# Patient Record
Sex: MALE | Race: WHITE | Employment: OTHER | URBAN - METROPOLITAN AREA
[De-identification: names, ages, dates, MRNs, and addresses within clinical notes are randomized per-mention and may not be internally consistent; named-entity substitution may affect disease eponyms.]

---

## 2017-06-07 PROBLEM — Z87.891 FORMER SMOKER: Status: ACTIVE | Noted: 2017-04-06

## 2017-06-07 PROBLEM — R79.89 ABNORMAL THYROID STIMULATING HORMONE (TSH) LEVEL: Status: ACTIVE | Noted: 2017-05-11

## 2019-11-11 PROBLEM — E03.9 ACQUIRED HYPOTHYROIDISM: Status: ACTIVE | Noted: 2017-05-11

## 2020-01-17 PROBLEM — F10.10 ETOH ABUSE: Status: ACTIVE | Noted: 2020-01-17

## 2020-01-20 ENCOUNTER — HOSPITAL ENCOUNTER (OUTPATIENT)
Dept: CT IMAGING | Age: 40
Discharge: HOME | End: 2020-01-20
Attending: INTERNAL MEDICINE
Payer: SELF-PAY

## 2020-01-20 DIAGNOSIS — I10 ESSENTIAL (PRIMARY) HYPERTENSION: ICD-10-CM

## 2020-01-20 DIAGNOSIS — E78.2 MIXED HYPERLIPIDEMIA: ICD-10-CM

## 2020-01-20 PROCEDURE — 75571 CT HRT W/O DYE W/CA TEST: CPT

## 2021-05-18 PROBLEM — Z78.9 ALCOHOL USE: Status: ACTIVE | Noted: 2020-01-17

## 2021-05-18 PROBLEM — R73.01 IFG (IMPAIRED FASTING GLUCOSE): Status: ACTIVE | Noted: 2021-05-18

## 2022-03-19 PROBLEM — R73.01 IFG (IMPAIRED FASTING GLUCOSE): Status: ACTIVE | Noted: 2021-05-18

## 2022-03-19 PROBLEM — E03.9 ACQUIRED HYPOTHYROIDISM: Status: ACTIVE | Noted: 2017-05-11

## 2022-03-19 PROBLEM — Z87.891 FORMER SMOKER: Status: ACTIVE | Noted: 2017-04-06

## 2022-03-19 PROBLEM — F10.90 ALCOHOL USE: Status: ACTIVE | Noted: 2020-01-17

## 2022-03-19 PROBLEM — Z78.9 ALCOHOL USE: Status: ACTIVE | Noted: 2020-01-17

## 2022-05-24 DIAGNOSIS — F41.1 ANXIETY, GENERALIZED: Primary | ICD-10-CM

## 2022-05-25 RX ORDER — DIAZEPAM 5 MG/1
5 TABLET ORAL 2 TIMES DAILY
Qty: 60 TABLET | Refills: 2 | Status: SHIPPED | OUTPATIENT
Start: 2022-05-25 | End: 2022-08-24 | Stop reason: SDUPTHER

## 2022-07-11 ENCOUNTER — TELEPHONE (OUTPATIENT)
Dept: INTERNAL MEDICINE CLINIC | Facility: CLINIC | Age: 42
End: 2022-07-11

## 2022-07-11 DIAGNOSIS — F33.1 MODERATE EPISODE OF RECURRENT MAJOR DEPRESSIVE DISORDER (HCC): ICD-10-CM

## 2022-07-11 DIAGNOSIS — F41.1 ANXIETY, GENERALIZED: Primary | ICD-10-CM

## 2022-08-11 ENCOUNTER — TELEPHONE (OUTPATIENT)
Dept: INTERNAL MEDICINE CLINIC | Facility: CLINIC | Age: 42
End: 2022-08-11

## 2022-08-23 DIAGNOSIS — F41.1 ANXIETY, GENERALIZED: ICD-10-CM

## 2022-08-24 RX ORDER — DIAZEPAM 5 MG/1
5 TABLET ORAL 2 TIMES DAILY
Qty: 60 TABLET | Refills: 2 | Status: SHIPPED | OUTPATIENT
Start: 2022-08-24 | End: 2022-09-23

## 2022-10-04 ENCOUNTER — OFFICE VISIT (OUTPATIENT)
Dept: BEHAVIORAL/MENTAL HEALTH CLINIC | Age: 42
End: 2022-10-04
Payer: MEDICAID

## 2022-10-04 VITALS
OXYGEN SATURATION: 98 % | HEART RATE: 87 BPM | HEIGHT: 70 IN | BODY MASS INDEX: 30.92 KG/M2 | SYSTOLIC BLOOD PRESSURE: 142 MMHG | DIASTOLIC BLOOD PRESSURE: 88 MMHG | WEIGHT: 216 LBS | TEMPERATURE: 98.1 F

## 2022-10-04 DIAGNOSIS — F41.0 PANIC DISORDER: Primary | ICD-10-CM

## 2022-10-04 PROCEDURE — 99417 PROLNG OP E/M EACH 15 MIN: CPT | Performed by: STUDENT IN AN ORGANIZED HEALTH CARE EDUCATION/TRAINING PROGRAM

## 2022-10-04 PROCEDURE — 99205 OFFICE O/P NEW HI 60 MIN: CPT | Performed by: STUDENT IN AN ORGANIZED HEALTH CARE EDUCATION/TRAINING PROGRAM

## 2022-10-04 RX ORDER — SERTRALINE HYDROCHLORIDE 100 MG/1
TABLET, FILM COATED ORAL
Qty: 30 TABLET | Refills: 2 | Status: SHIPPED | OUTPATIENT
Start: 2022-10-04

## 2022-10-04 RX ORDER — DIAZEPAM 5 MG/1
TABLET ORAL
COMMUNITY
Start: 2022-09-23

## 2022-10-04 ASSESSMENT — ANXIETY QUESTIONNAIRES
GAD7 TOTAL SCORE: 13
7. FEELING AFRAID AS IF SOMETHING AWFUL MIGHT HAPPEN: 2
6. BECOMING EASILY ANNOYED OR IRRITABLE: 1
1. FEELING NERVOUS, ANXIOUS, OR ON EDGE: 3
IF YOU CHECKED OFF ANY PROBLEMS ON THIS QUESTIONNAIRE, HOW DIFFICULT HAVE THESE PROBLEMS MADE IT FOR YOU TO DO YOUR WORK, TAKE CARE OF THINGS AT HOME, OR GET ALONG WITH OTHER PEOPLE: SOMEWHAT DIFFICULT
2. NOT BEING ABLE TO STOP OR CONTROL WORRYING: 3
4. TROUBLE RELAXING: 1
5. BEING SO RESTLESS THAT IT IS HARD TO SIT STILL: 0
3. WORRYING TOO MUCH ABOUT DIFFERENT THINGS: 3

## 2022-10-04 ASSESSMENT — PATIENT HEALTH QUESTIONNAIRE - PHQ9
SUM OF ALL RESPONSES TO PHQ QUESTIONS 1-9: 0
2. FEELING DOWN, DEPRESSED OR HOPELESS: 0
1. LITTLE INTEREST OR PLEASURE IN DOING THINGS: 0
SUM OF ALL RESPONSES TO PHQ QUESTIONS 1-9: 0
SUM OF ALL RESPONSES TO PHQ9 QUESTIONS 1 & 2: 0

## 2022-10-04 NOTE — PROGRESS NOTES
T[place]       Patient Name: Karyna Reed    Patient : 1980    Patient MRN: [id number]    Insurance: [facility] [employer]    Primary Language: Zoey Zepeda      Date of Service: 10/4/2022    Type of Service: Medication management    Other Services Involved: N/A       Phone Number: [phone]   Emergency Contact: Pamela Martinez, partner, [phone]       Chief Complaint: \"I'm just really nervous right now\"      History of Present Illness    Karyna Reed is a 43 y.o. male with reported prior psychiatric history significant for anxiety who presents for initial evaluation. Pt reports that they are currently prescribed: Celexa 40 mg (states that he has been attempting to wean off and is only taking 20 mg), Valium 5 mg BID PRN (typically takes twice daily). 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 While he is often able to work through his panic, he has more frequently had to leave early during performances. States that he was diagnosed with GLO, panic disorder when he was 15 yo, expressing that he would frequently experience stomach issues that required further work-up prior to this diagnosis. Reports being prescribed several medications over the year, some helpful some not, and had periods of up to several years in which he didn't experience significant symptoms. States that his symptoms have gradually worsened and are now occurring a couple times/wk, but often feels higher baseline anxiety daily. Reports that his PCP had increased his Celexa to 40 mg, which was helpful, but caused SE (sexual dysfunction). Psychiatric Review of Systems    Depression: Denies depressed mood and anhedonia. Expresses that he cannot recall a time in which he experienced symptoms of clinical depression for an extended period of time.  Denies current or active SI/HI. Anxiety: Denies generalized worries or difficulty controlling these . Reports that the only thing that bothers him frequently is a \"health anxiety,\" stating that he is often concerned about his health, but does not necessarily seek frequent medical treatment. States that his heart is of particular concern due to a hx of recurrent PVCs, but has had prior cardiac w/u w/o significant concern. Of note, he shares that he had multiple family members die around the time he first began experiencing the above anxiety, and thinks it may contribute to his anxiety. Panic: reports hx of panic since childhood; states that he will initially experience a sensation of dizziness, fear of something bad happening, hyperventilation, shaking, etc. Generally lasts 10-15 min, but will sometimes fixate on his symptoms for up to an hour. Reports avoidance behaviors due to concern of having a panic attack and being unable to escape. Denies agoraphobia.     Hypomania/yanna: denies prior hx of distinct periods of elevated mood, grandiosity, decreased need for sleep, etc.    Psychosis: denies hallucinations, delusions, disorganized speech, and disorganized or catatonic behaviors    Trauma: denies re-experiencing, avoidance behaviors, hypervigilance or reactivity, negative self-concept, affect dysregulation, and interpersonal difficulties related to prior traumatic event         Psychiatric History    Inpatient psychiatric hospitalizations: denies    Previous outpatient psychiatric treatment: first received medication for panic when he was 15-13 yo through his PCP; off and on over the years    Prior therapy: saw a therapist during adolescence, off and on; last worked with counselor 3-4 yrs ago    Prior psychiatric medication trials: Anafrinil, Paxil, Zoloft, Buspar, Celexa, Luvox    Current psychiatric medication: Celexa 20 mg daily, Valium 5 mg BID PRN (typically takes twice/day)    History of suicide attempts: denies    Self injurious behaviors: briefly cut in his early 25s    History of trauma, violence or abuse: denies prior physical abuse; reports hx of yelling between his parents, but can't recall significant verbal abuse directed towards him; denies remembering clear details of prior sexual abuse, but states that \"my wife is convinced something happened to me, saying that I have all the symptoms\"      Family History    Mental illness in family: father - thinks that he may struggle with anxiety, but isn't diagnosed    Substance abuse in family: no known    Completed suicide in family: no known         Social History    From [place], raised by mother and grandparents, but would still see his father; reports that his parents  when he was 3 yo, stating that they \"would argue, yell all of the time;\" states father had anger issues, would kick holes in the wall, but has never seen father be violent; describes childhood as \"fairly well\"    : denies    Children: three children (6 yo, 10 yo, 3 yo)    Living Situation: with partner, three children    Level of Education:     Occupation: musician; instrument repair     History:  denies    Legal History: denies    Guns in home: reports having guns; locked in safe; loaded         Substance Abuse History    Tobacco: previously smoked cigarettes up to 1/2 ppd; quit nearly 10 yrs ago    Alcohol: reports drinking daily, typically 4-6 drinks/day (beer); reports that these has been consistent for the past several months, but at least 2 beers/day over the past several years    Cannabis: denies    Stimulants: denies    Opioids: denies    Benzodiazepines: reportedly only as prescribed    Hallucinogens: denies    Other: denies    The patient denies the use of any other substance of abuse.     History of drug rehabilitation or detoxification: denies        Past Medical History:    Past Medical History:   Diagnosis Date    Anxiety     Depression     Hypertension     Hypertriglyceridemia Allergies:    Patient has no known allergies. Current Home Medications:    Current Outpatient Medications   Medication Instructions    atenolol (TENORMIN) 100 MG tablet TAKE 1 TAB BY MOUTH DAILY FOR 30 DAYS. citalopram (CELEXA) 40 MG tablet 1 tab po qd    diazePAM (VALIUM) 5 MG tablet TAKE 1 TABLET BY MOUTH TWICE A DAY    fenofibrate (TRICOR) 145 mg, Oral, DAILY         PDMP:  Last reviewed: 10/4/22    09/23/2022 08/24/2022 1   Diazepam 5 Mg Tablet  60.00 30 Sofiya Garrison [id number] Chastity ([id number]) 1/2 1.00 [facility] [place]  08/24/2022 08/24/2022 1   Diazepam 5 Mg Tablet  60.00 30 Sofiya Garrison [id number] Chastity ([id number]) 0/2 1.00 [place]  07/25/2022 05/25/2022 1   Diazepam 5 Mg Tablet  60.00 30 Sofiya Garrison [id number] Chastity ([id number]) 2/2 1.00 [place] [place]  06/24/2022 05/25/2022 1   Diazepam 5 Mg Tablet  60.00 30 Sofiya Garrison [id number] Chastity ([id number]) 1/2 1.00 [place] [place]  05/25/2022 05/25/2022 1   Diazepam 5 Mg Tablet  60.00 30 Sofiya Garrison [id number] Chastity ([id number]) 0/2 1.00 [place]  04/26/2022 02/25/2022 1   Diazepam 5 Mg Tablet  60.00 30 Sofiya Garrison [id number] Chastity ([id number]) 2/2 1.00 [place] [place]       Review of systems    Constitutional: denies significant weight loss/gain, fatigue    HEENT: denies rhinorrhea, sore throat. Respiratory: denies cough, shortness of breath    Cardiovascular: denies chest pain    GI: +D; denies N/V/C, abdominal pain. MSK: denies joint pain, muscle stiffness/soreness, back pain, neck pain. Neuro: denies HA, dizziness. Psychiatric: as above and below.        Vital Signs:     Temp Readings from Last 3 Encounters:   10/04/22 98.1 °F (36.7 °C) (Oral)       BP Readings from Last 3 Encounters:   10/04/22 (!) 142/88       Pulse Readings from Last 3 Encounters:   10/04/22 87          Lab Results:     Lab Results   Component Value Date/Time    WBC 6.1 11/03/2020 09:47 AM    HGB 16.0 11/03/2020 09:47 AM    HCT 47.5 11/03/2020 09:47 AM    MCV 88 11/03/2020 09:47 AM    MCH 29.5 11/03/2020 09:47 AM    MCHC 33.7 11/03/2020 09:47 AM    RDW 13.4 11/03/2020 09:47 AM    MONOPCT 9 11/03/2020 09:47 AM    BASOPCT 1 11/03/2020 09:47 AM         Lab Results   Component Value Date    TRIG 202 (H) 05/11/2021    HDL 35 (L) 05/11/2021    CHOL 195 05/11/2021    GLUCOSE 84 05/11/2021        All pertinent/available labs reviewed. Mental Status Examination    General/Appearance: Appears stated age, Well-kept, and Appropriately attired; wearing glasses    Behavior: cooperative, engaged, nervous    Eye Contact: fair    Psychomotor: Within normal limits, though restless    Musculoskeletal: gait wnl    Speech/Language: Within normal limits    Mood: \"anxious, uncertain\"    Affect: Appropriate, Congruent, and Restricted range, though able to smile appropriately    Thought process: linear, goal directed, and coherent    Thought content: denies SI/HI, A/VH, paranoia or delusions    Orientation: oriented to person, place, and time    Memory: Intact long-term and Intact short-term    Attention/Concentration: Sustained    Fund of knowledge: fair    Judgement: fair    Insight: fair         Questionnaire Findings:    PHQ2: 0 (10/4/22)    GAD7: 13 (10/4/22)         Assessment/Summary of Findings:    Nga Kennedy is a 43 y.o. male with reported prior psychiatric history significant for anxiety who presents for initial evaluation. Pt reports that they are currently prescribed: Celexa 40 mg (states that he has been attempting to wean off and is only taking 20 mg), Valium 5 mg BID PRN (typically takes twice daily). Pt reports a long-standing history of recurrent panic and health anxiety since adolescence following the deaths of multiple family members, especially his grandfather after an unexpected MI. States that he has been on various medications over the years and experienced some benefit in the past, but is unable to recall which specific medications were helpful.  Currently reports that he is inconsistent with Celexa, expressing that he has been taking it roughly every other day to due to his attempt to wean off 2/2 SE (primarily sexual dysfunction). However, he reports that he is consistent with Valium, noting that he tends to take it twice daily on average. Also shares that he has been using alcohol more frequently and is currently drinking 4-6 drinks daily, on average. Discussed risks associated with concurrent use of BZO and EtOH, and he expressed that he feels that he would likely use less EtOH if his panic was under better control. Discussed treatment options, including medication management and non-pharmacological options, and he is open to retrial of Zoloft, which may have been one of the prior beneficial medications. Additionally, he is open to referral for counseling. Denies SI/HI, A/VH, paranoia or delusions. Panic  Alcohol  Start zoloft 50 x 2 wk, then inc to 100  D/c celex (doesn't take 20 every day)  Cont nitin, but attempt to minimize, avoid use with alcohol  Ref for therapy     Diagnosis:   Panic disorder  Alcohol use disorder, moderate       Plan:    Franko Rios will receive medication management, therapy at [address] [place] [address],[address]. Medication Recommendations:    - Discontinue Celexa 20 mg    - Start Zoloft 50 mg QHS for two weeks, then increase to 100 mg QHS as tolerated; will monitor for development of SE, especially sexual SE    - Continue Valium 5 mg BID PRN, but discussed minimizing use, especially around EtOH in effort to reduce overall risk    - Medication side effect profiles, risks, and benefits were discussed with the patient. - Patient encouraged to contact the clinic if experiencing any adverse reactions with medications.       Other Recommendations:    - Refer to individual counseling for panic    - If patient has any concerns for their own safety due to adverse reactions to medications, suicidal or homicidal ideations, auditory or visual hallucinations, or delusions, they have been told to call 911 or go to the nearest emergency department.       RTC: 1 mo      95 minutes were spent on the day of the encounter for preparation/chart review, evaluation, psychoeducation, medication management, supportive counseling, documentation, and all associated orders/referrals/communications for the above E/M service      Jessica Cevallos MD    10/5/2022 9:40 AM    [address]

## 2022-10-19 ENCOUNTER — OFFICE VISIT (OUTPATIENT)
Dept: CARDIOLOGY CLINIC | Age: 42
End: 2022-10-19
Payer: MEDICAID

## 2022-10-19 VITALS
HEIGHT: 70 IN | WEIGHT: 214.4 LBS | DIASTOLIC BLOOD PRESSURE: 102 MMHG | HEART RATE: 90 BPM | SYSTOLIC BLOOD PRESSURE: 160 MMHG | BODY MASS INDEX: 30.69 KG/M2

## 2022-10-19 DIAGNOSIS — R00.2 PALPITATIONS: Primary | ICD-10-CM

## 2022-10-19 DIAGNOSIS — Z78.9 ALCOHOL USE: ICD-10-CM

## 2022-10-19 DIAGNOSIS — F41.1 ANXIETY, GENERALIZED: ICD-10-CM

## 2022-10-19 DIAGNOSIS — E78.2 MIXED HYPERLIPIDEMIA: ICD-10-CM

## 2022-10-19 DIAGNOSIS — I10 ESSENTIAL (PRIMARY) HYPERTENSION: ICD-10-CM

## 2022-10-19 DIAGNOSIS — R00.2 PALPITATION: ICD-10-CM

## 2022-10-19 PROCEDURE — 93000 ELECTROCARDIOGRAM COMPLETE: CPT | Performed by: INTERNAL MEDICINE

## 2022-10-19 PROCEDURE — 99214 OFFICE O/P EST MOD 30 MIN: CPT | Performed by: INTERNAL MEDICINE

## 2022-10-19 RX ORDER — VALSARTAN 80 MG/1
80 TABLET ORAL DAILY
Qty: 30 TABLET | Refills: 3 | Status: SHIPPED | OUTPATIENT
Start: 2022-10-19

## 2022-10-19 ASSESSMENT — ENCOUNTER SYMPTOMS: SHORTNESS OF BREATH: 0

## 2022-10-19 NOTE — PROGRESS NOTES
[address], [address]  PHONE: [phone]    Jayant Bernstein  1980      SUBJECTIVE:   Jayant Bernstein is a 43 y.o. male seen for a follow up visit regarding the following:     Chief Complaint   Patient presents with    Palpitations     LS 2020       HPI:    61-year-old male comes back after 2 years for history of hyperlipidemia which is mixed pattern has had some hypertension and a lot of anxiety. He says he had some palpitations and he had an old EKG brought with a single PVC. He has a lot of anxiety recently saw some psychiatry has been on Celexa but was    Changing to Zoloft. He is not sure why he has been more anxious and panicky at times. He has no exertional chest pain or shortness of breath. Sometimes he notes some palpitations. He still drinks probably 4-6 beers a day. Past Medical History, Past Surgical History, Family history, Social History, and Medications were all reviewed with the patient today and updated as necessary. No Known Allergies  Past Medical History:   Diagnosis Date    Anxiety     Depression     Hypertension     Hypertriglyceridemia      Past Surgical History:   Procedure Laterality Date    OTHER SURGICAL HISTORY  1981    Eye Surgery      Family History   Problem Relation Age of Onset    Hypertension Mother     Hypertension Father     Heart Disease Father     Diabetes Father     Heart Attack Father       Social History     Tobacco Use    Smoking status: Former     Packs/day: 0.50     Types: Cigarettes     Quit date: 1/1/2012     Years since quitting: 10.8    Smokeless tobacco: Never   Substance Use Topics    Alcohol use: Yes     Alcohol/week: 12.0 standard drinks       ROS:    Review of Systems   Constitutional: Negative for decreased appetite. Cardiovascular:  Positive for palpitations. Negative for chest pain and dyspnea on exertion. Respiratory:  Negative for shortness of breath.     Hematologic/Lymphatic: Negative for bleeding problem. Psychiatric/Behavioral:  The patient is nervous/anxious. PHYSICAL EXAM:    BP (!) 160/102   Pulse 90   Ht 5' 10\" (1.778 m)   Wt 214 lb 6.4 oz (97.3 kg)   BMI 30.76 kg/m²        Wt Readings from Last 3 Encounters:   10/19/22 214 lb 6.4 oz (97.3 kg)   10/04/22 216 lb (98 kg)     BP Readings from Last 3 Encounters:   10/19/22 (!) 160/102   10/04/22 (!) 142/88         Physical Exam  Constitutional:       General: He is not in acute distress. Cardiovascular:      Rate and Rhythm: Normal rate and regular rhythm. Heart sounds: No murmur heard. No gallop. Pulmonary:      Effort: Pulmonary effort is normal.      Breath sounds: Normal breath sounds. Musculoskeletal:         General: No swelling. Skin:     General: Skin is warm. Neurological:      Mental Status: He is alert. Medical problems and test results were reviewed with the patient today. Results for orders placed or performed in visit on 10/19/22   EKG 12 Lead    Impression    Normal sinus rhythm rate 90 possible right atrial argument. Normal intervals. No significant ST changes. Lab Results   Component Value Date/Time     05/11/2021 10:07 AM    K 3.9 05/11/2021 10:07 AM    CL 99 05/11/2021 10:07 AM    CO2 24 05/11/2021 10:07 AM    BUN 13 05/11/2021 10:07 AM    GFRAA 99 05/11/2021 10:07 AM     Lab Results   Component Value Date/Time    CHOL 195 05/11/2021 10:07 AM    HDL 35 05/11/2021 10:07 AM    VLDL 36 05/11/2021 10:07 AM   Echo February 2020 did not show any significant atrial enlargement normal function. Calcium score 0    ASSESSMENT and PLAN    Luis Armando Celestin was seen today for palpitations. Diagnoses and all orders for this visit:    Palpitations occasionally has some PVCs in the past but asymptomatic normal function. He will stay on beta-blocker therapy without any antiarrhythmic therapy.   Encouraged him to stop drinking and sometimes anxiety can make him worse  -     EKG 12 Lead  -     Lipid Panel; Future    Essential (primary) hypertension blood pressure is running high still we will continue beta-blocker but add in an ARB Diovan 80/day  -     EKG 12 Lead    Mixed hyperlipidemia he has mixed hyperlipidemia cholesterols been high at 400 the past last time they were less than 200 a year ago triglycerides been very high to come down to 700 200 range. He is to repeat that lab profile and see where he is at this point calcium score 0  -     Lipid Panel; Future    Anxiety, generalized is increasing anxiety is seeing a psychiatrist     Alcohol use encouraged him to maybe cut the alcohol back    Palpitation has a known PVCs I will discontinue beta-blocker therapy    Other orders  -     valsartan (DIOVAN) 80 MG tablet; Take 1 tablet by mouth daily      [unfilled]      No follow-up provider specified.     Campbell Anderson MD  10/19/2022  2:17 PM

## 2022-11-14 DIAGNOSIS — E78.2 MIXED HYPERLIPIDEMIA: ICD-10-CM

## 2022-11-14 DIAGNOSIS — I10 ESSENTIAL (PRIMARY) HYPERTENSION: ICD-10-CM

## 2022-11-14 DIAGNOSIS — F41.1 ANXIETY, GENERALIZED: Primary | ICD-10-CM

## 2022-11-14 RX ORDER — DIAZEPAM 5 MG/1
TABLET ORAL
Status: CANCELLED | OUTPATIENT
Start: 2022-11-14

## 2022-11-15 RX ORDER — ATENOLOL 100 MG/1
TABLET ORAL
Qty: 30 TABLET | Refills: 5 | Status: SHIPPED | OUTPATIENT
Start: 2022-11-15

## 2022-11-15 RX ORDER — FENOFIBRATE 145 MG/1
145 TABLET, COATED ORAL DAILY
Qty: 30 TABLET | Refills: 5 | Status: SHIPPED | OUTPATIENT
Start: 2022-11-15

## 2022-11-15 RX ORDER — DIAZEPAM 5 MG/1
5 TABLET ORAL EVERY 12 HOURS PRN
Qty: 60 TABLET | Refills: 2 | Status: SHIPPED | OUTPATIENT
Start: 2022-11-15 | End: 2022-12-15

## 2022-11-22 DIAGNOSIS — E78.2 MIXED HYPERLIPIDEMIA: ICD-10-CM

## 2022-11-22 DIAGNOSIS — R00.2 PALPITATIONS: ICD-10-CM

## 2022-11-23 LAB
CHOLEST SERPL-MCNC: 221 MG/DL
HDLC SERPL-MCNC: 34 MG/DL (ref 40–60)
HDLC SERPL: 6.5 {RATIO}
LDLC SERPL CALC-MCNC: ABNORMAL MG/DL
LDLC SERPL DIRECT ASSAY-MCNC: 87 MG/DL
TRIGL SERPL-MCNC: 470 MG/DL (ref 35–150)
VLDLC SERPL CALC-MCNC: 94 MG/DL (ref 6–23)

## 2022-11-23 RX ORDER — ICOSAPENT ETHYL 1000 MG/1
2 CAPSULE ORAL DAILY
Qty: 60 CAPSULE | Refills: 3 | Status: SHIPPED | OUTPATIENT
Start: 2022-11-23

## 2022-11-23 NOTE — TELEPHONE ENCOUNTER
Patient notified lab results Per Snow Arnold send Rx to Mrs Reyna Rosey to sign.   Requested Prescriptions     Pending Prescriptions Disp Refills    Icosapent Ethyl (VASCEPA) 1 g CAPS capsule 60 capsule 3     Sig: Take 2 capsules by mouth daily

## 2022-11-23 NOTE — TELEPHONE ENCOUNTER
----- Message from Abhishek Gutierres MD sent at 11/23/2022  1:59 PM EST -----  Call pt cholesterol still over 400 LDL cholesterol is 87 which is not too bad.   He needs some primary and some Vascepa 2000 mg/day to try to help get the triglycerides down further

## 2022-12-02 ENCOUNTER — OFFICE VISIT (OUTPATIENT)
Dept: BEHAVIORAL/MENTAL HEALTH CLINIC | Age: 42
End: 2022-12-02
Payer: MEDICAID

## 2022-12-02 VITALS
DIASTOLIC BLOOD PRESSURE: 96 MMHG | HEART RATE: 75 BPM | SYSTOLIC BLOOD PRESSURE: 134 MMHG | HEIGHT: 70 IN | TEMPERATURE: 98.3 F | BODY MASS INDEX: 30.64 KG/M2 | OXYGEN SATURATION: 97 % | WEIGHT: 214 LBS

## 2022-12-02 DIAGNOSIS — F41.0 PANIC DISORDER: ICD-10-CM

## 2022-12-02 PROCEDURE — 3079F DIAST BP 80-89 MM HG: CPT | Performed by: STUDENT IN AN ORGANIZED HEALTH CARE EDUCATION/TRAINING PROGRAM

## 2022-12-02 PROCEDURE — 99213 OFFICE O/P EST LOW 20 MIN: CPT | Performed by: STUDENT IN AN ORGANIZED HEALTH CARE EDUCATION/TRAINING PROGRAM

## 2022-12-02 PROCEDURE — 3075F SYST BP GE 130 - 139MM HG: CPT | Performed by: STUDENT IN AN ORGANIZED HEALTH CARE EDUCATION/TRAINING PROGRAM

## 2022-12-02 NOTE — PROGRESS NOTES
T[place]       Patient Name: Sean Stoner    Patient : 1980    Patient MRN: [id number]    Insurance: [employer]    Primary Language: [place]      Date of Service: 2022    Type of Service: Medication management    Other Services Involved: N/A       Phone Number: [phone]   Emergency Contact: Mukesh Rouse, partner, [phone]       Chief Complaint: \"I'm doing a little better\"      History of Present Illness    Sean Stoner is a 43 y.o. male with reported prior psychiatric history significant for anxiety who presents for medication management. Pt reports that they are currently prescribed: Zoloft 100 mg QHS, Valium 5 mg BID PRN (typically takes twice daily). Pt reports that he feels like he is doing \"a little better\" regarding his anxiety since transitioning to Zoloft. He thinks that it has helped with his intrusive thoughts about somatic symptoms, stating that he tends to dwell on them less. Reports that he did not increase his dose beyond 50 mg due to his concerns regarding too much medication. States that he missed the initial call for therapy referral and is open to following up. Reports compliance with medication and experienced mild dizziness when he first started Zoloft, but this has since improved. Denies SI/HI, A/VH, paranoia or delusions. 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 While he is often able to work through his panic, he has more frequently had to leave early during performances. States that he was diagnosed with GLO, panic disorder when he was 15 yo, expressing that he would frequently experience stomach issues that required further work-up prior to this diagnosis.  Reports being prescribed several medications over the year, some helpful some not, and had periods of up to several years in which he didn't experience significant symptoms. States that his symptoms have gradually worsened and are now occurring a couple times/wk, but often feels higher baseline anxiety daily. Reports that his PCP had increased his Celexa to 40 mg, which was helpful, but caused SE (sexual dysfunction). Psychiatric Review of Systems    Depression: Denies depressed mood and anhedonia. Expresses that he cannot recall a time in which he experienced symptoms of clinical depression for an extended period of time. Denies current or active SI/HI. Anxiety: Denies generalized worries or difficulty controlling these . Reports that the only thing that bothers him frequently is a \"health anxiety,\" stating that he is often concerned about his health, but does not necessarily seek frequent medical treatment. States that his heart is of particular concern due to a hx of recurrent PVCs, but has had prior cardiac w/u w/o significant concern. Of note, he shares that he had multiple family members die around the time he first began experiencing the above anxiety, and thinks it may contribute to his anxiety. Panic: reports hx of panic since childhood; states that he will initially experience a sensation of dizziness, fear of something bad happening, hyperventilation, shaking, etc. Generally lasts 10-15 min, but will sometimes fixate on his symptoms for up to an hour. Reports avoidance behaviors due to concern of having a panic attack and being unable to escape. Denies agoraphobia.     Hypomania/yanna: denies prior hx of distinct periods of elevated mood, grandiosity, decreased need for sleep, etc.    Psychosis: denies hallucinations, delusions, disorganized speech, and disorganized or catatonic behaviors    Trauma: denies re-experiencing, avoidance behaviors, hypervigilance or reactivity, negative self-concept, affect dysregulation, and interpersonal difficulties related to prior traumatic event Psychiatric History    Please see prior records. New medication trials: Zoloft      Family History    Please see prior records. No updates at this time. Social History    Please see prior records. No updates at this time. Substance Abuse History    Please see prior records. No updates at this time. Past Medical History:    Past Medical History:   Diagnosis Date    Anxiety     Depression     Hypertension     Hypertriglyceridemia             Allergies:    Patient has no known allergies. Current Home Medications:    Current Outpatient Medications   Medication Instructions    atenolol (TENORMIN) 100 MG tablet 1/2 tablet by mouth BID    citalopram (CELEXA) 40 MG tablet 1 tab po qd    diazePAM (VALIUM) 5 mg, Oral, EVERY 12 HOURS PRN    fenofibrate (TRICOR) 145 mg, Oral, DAILY    Icosapent Ethyl (VASCEPA) 1 g CAPS capsule 2 capsules, Oral, DAILY    sertraline (ZOLOFT) 100 MG tablet Take 1/2 tablet nightly for two weeks, then increase to 1 tablet nightly as tolerated    valsartan (DIOVAN) 80 mg, Oral, DAILY         PDMP:  Last reviewed: 10/4/22    09/23/2022 08/24/2022 1   Diazepam 5 Mg Tablet  60.00 30 [employer] [id number] Chastity ([id number]) 1/2 1.00 [place]  08/24/2022 08/24/2022 1   Diazepam 5 Mg Tablet  60.00 30 [employer] [id number] Chastity ([id number]) 0/2 1.00 [place]  07/25/2022 05/25/2022 1   Diazepam 5 Mg Tablet  60.00 30 [employer] [id number] Chastity ([id number]) 2/2 1.00 [place]  06/24/2022 05/25/2022 1   Diazepam 5 Mg Tablet  60.00 30 [employer] [id number] Chastity ([id number]) 1/2 1.00 [place]  05/25/2022 05/25/2022 1   Diazepam 5 Mg Tablet  60.00 30 [employer] [id number] Chastity (8559) 0/2 1.00 [place]  04/26/2022 02/25/2022 1   Diazepam 5 Mg Tablet  60.00 30  Bo [id number] Chastity ([id number]) 2/2 1.00 [place]       Review of systems    Constitutional: denies significant weight loss/gain, fatigue    HEENT: denies rhinorrhea, sore throat.     Respiratory: denies cough, shortness of breath    Cardiovascular: denies chest pain    GI: +D; denies N/V/C, abdominal pain. MSK: denies joint pain, muscle stiffness/soreness, back pain, neck pain. Neuro: denies HA, dizziness. Psychiatric: as above and below. Vital Signs:     Temp Readings from Last 3 Encounters:   10/04/22 98.1 °F (36.7 °C) (Oral)       BP Readings from Last 3 Encounters:   10/19/22 (!) 160/102   10/04/22 (!) 142/88       Pulse Readings from Last 3 Encounters:   10/19/22 90   10/04/22 87          Lab Results:     Lab Results   Component Value Date/Time    WBC 6.1 11/03/2020 09:47 AM    HGB 16.0 11/03/2020 09:47 AM    HCT 47.5 11/03/2020 09:47 AM    MCV 88 11/03/2020 09:47 AM    MCH 29.5 11/03/2020 09:47 AM    MCHC 33.7 11/03/2020 09:47 AM    RDW 13.4 11/03/2020 09:47 AM    MONOPCT 9 11/03/2020 09:47 AM    BASOPCT 1 11/03/2020 09:47 AM         Lab Results   Component Value Date    TRIG 470 (H) 11/22/2022    HDL 34 (L) 11/22/2022    CHOL 221 (H) 11/22/2022    GLUCOSE 84 05/11/2021        All pertinent/available labs reviewed. Mental Status Examination    General/Appearance: Appears stated age, Well-kept, and Appropriately attired; wearing glasses    Behavior: cooperative, engaged, nervous    Eye Contact: fair    Psychomotor:  Within normal limits, though restless    Musculoskeletal: gait wnl    Speech/Language: Within normal limits    Mood: \"anxious, uncertain\"    Affect: Appropriate, Congruent, and Restricted range, though able to smile appropriately    Thought process: linear, goal directed, and coherent    Thought content: denies SI/HI, A/VH, paranoia or delusions    Orientation: oriented to person, place, and time    Memory: Intact long-term and Intact short-term    Attention/Concentration: Sustained    Fund of knowledge: fair    Judgement: fair    Insight: fair         Questionnaire Findings:    PHQ2: 0 (10/4/22)    GAD7: 13 (10/4/22)         Assessment/Summary of Findings:    Jeffy Townsend is a 43 y.o. male with reported prior psychiatric history significant for anxiety who presents for medication management. Pt reports that they are currently prescribed: Zoloft 100 mg QHS, Valium 5 mg BID PRN (typically takes twice daily). Pt reports that he can appreciate a partial response to transition to Zoloft, but did not increase his dose beyond 50 mg due to concern for too much medication. Notes that he experience mild dizziness after initially starting, but this has since resolved. Reports that the biggest difference has been a reduction in his intrusive thoughts regarding somatic symptoms. Discussed ongoing titration at this time, but will only slightly increase dose to 75 mg in order to provide ample time for tolerability. Pt will f/u regarding therapy referral, as he was unable to receive VM when he was first contacted. Denies SI/HI, A/VH, paranoia or delusions. Diagnosis:   Panic disorder  Alcohol use disorder, moderate       Plan:    Nga Kennedy will receive medication management, therapy at [facility]. Medication Recommendations:    - Increase Zoloft to 75 mg QHS for anxiety    - Continue Valium 5 mg BID PRN, but discussed minimizing use, especially around EtOH in effort to reduce overall risk    - Medication side effect profiles, risks, and benefits were discussed with the patient. - Patient encouraged to contact the clinic if experiencing any adverse reactions with medications. Other Recommendations:    - Refer to individual counseling for panic    - If patient has any concerns for their own safety due to adverse reactions to medications, suicidal or homicidal ideations, auditory or visual hallucinations, or delusions, they have been told to call 911 or go to the nearest emergency department.       RTC: 1 mo        Tony Bender MD    12/7/2022 4:02 PM    [address] [facility]

## 2022-12-09 ENCOUNTER — TELEMEDICINE (OUTPATIENT)
Dept: INTERNAL MEDICINE CLINIC | Facility: CLINIC | Age: 42
End: 2022-12-09
Payer: MEDICAID

## 2022-12-09 DIAGNOSIS — E55.9 VITAMIN D INSUFFICIENCY: ICD-10-CM

## 2022-12-09 DIAGNOSIS — E78.2 MIXED HYPERLIPIDEMIA: ICD-10-CM

## 2022-12-09 DIAGNOSIS — I10 ESSENTIAL (PRIMARY) HYPERTENSION: ICD-10-CM

## 2022-12-09 DIAGNOSIS — F41.1 ANXIETY, GENERALIZED: ICD-10-CM

## 2022-12-09 DIAGNOSIS — F33.1 MODERATE EPISODE OF RECURRENT MAJOR DEPRESSIVE DISORDER (HCC): ICD-10-CM

## 2022-12-09 DIAGNOSIS — R73.03 PREDIABETES: Primary | ICD-10-CM

## 2022-12-09 DIAGNOSIS — E03.9 ACQUIRED HYPOTHYROIDISM: ICD-10-CM

## 2022-12-09 PROCEDURE — 99214 OFFICE O/P EST MOD 30 MIN: CPT | Performed by: INTERNAL MEDICINE

## 2022-12-09 RX ORDER — DIAZEPAM 5 MG/1
5 TABLET ORAL EVERY 8 HOURS PRN
Qty: 90 TABLET | Refills: 2 | Status: SHIPPED | OUTPATIENT
Start: 2022-12-09 | End: 2023-01-08

## 2022-12-09 ASSESSMENT — ENCOUNTER SYMPTOMS: RESPIRATORY NEGATIVE: 1

## 2022-12-09 NOTE — PROGRESS NOTES
[facility]      2022    Patient Name: Esther Vega  :  1980    Subjective:    Chief Complaint:  Chief Complaint   Patient presents with    Follow-up         HPI I was at home while conducting this encounter. Consent:  He and/or his healthcare decision maker is aware that this patient-initiated Telehealth encounter is a billable service, with coverage as determined by his insurance carrier. He is aware that he may receive a bill and has provided verbal consent to proceed: Yes    This virtual visit was conducted via [employer]. Pursuant to the emergency declaration under the [facility], [facility] waiver authority and the [employer] Act, this Virtual  Visit was conducted to reduce the patient's risk of exposure to COVID-19 and provide continuity of care for an established patient. Services were provided through a video synchronous discussion virtually to substitute for in-person clinic visit. Due to this being a TeleHealth evaluation, many elements of the physical examination are unable to be assessed. Total Time: minutes: 11-20 minutes. Patient evaluated for f/u; he has not had fasting labs done since before the pandemic; He saw Dr. Haresh Harris, Cardiology, and was prescribed Valsartan for his blood pressure and fish oil; He has depression and anxiety, saw Psychiatry; he was changed from Celexa to Zoloft and feeling well now; he takes Valium prn and needs a refill today;   HTN:  Patient compliant with taking blood pressure medications: Yes  Discussed importance of following low sodium DASH diet, increasing physical activity, taking medications as ordered, decreasing alcohol intake, keeping f/u visits to recheck blood pressure, monitoring blood pressure at home and keeping a log, with goal blood pressure <140/90.   Home bp readings are: 117/70      Past Medical History:   Diagnosis Date    Anxiety Depression     Hypertension     Hypertriglyceridemia        Past Surgical History:   Procedure Laterality Date    OTHER SURGICAL HISTORY  1981    Eye Surgery        Family History   Problem Relation Age of Onset    Hypertension Mother     Hypertension Father     Heart Disease Father     Diabetes Father     Heart Attack Father        Social History     Tobacco Use    Smoking status: Former     Packs/day: 0.50     Types: Cigarettes     Quit date: 1/1/2012     Years since quitting: 10.9    Smokeless tobacco: Never   Substance Use Topics    Alcohol use: Yes     Alcohol/week: 12.0 standard drinks    Drug use: No                 Current Outpatient Medications:     diazePAM (VALIUM) 5 MG tablet, Take 1 tablet by mouth every 8 hours as needed for Anxiety for up to 30 days. , Disp: 90 tablet, Rfl: 2    sertraline (ZOLOFT) 50 MG tablet, Take 1.5 tablets by mouth daily, Disp: 45 tablet, Rfl: 2    Icosapent Ethyl (VASCEPA) 1 g CAPS capsule, Take 2 capsules by mouth daily, Disp: 60 capsule, Rfl: 3    fenofibrate (TRICOR) 145 MG tablet, Take 1 tablet by mouth daily, Disp: 30 tablet, Rfl: 5    atenolol (TENORMIN) 100 MG tablet, 1/2 tablet by mouth BID, Disp: 30 tablet, Rfl: 5    valsartan (DIOVAN) 80 MG tablet, Take 1 tablet by mouth daily, Disp: 30 tablet, Rfl: 3    No Known Allergies    Review of Systems   Constitutional: Negative. HENT: Negative. Respiratory: Negative. Cardiovascular: Negative. Psychiatric/Behavioral:  The patient is nervous/anxious. Objective: There were no vitals taken for this visit. Examination:  Physical Exam  Neurological:      Mental Status: He is alert. Psychiatric:         Mood and Affect: Mood normal.         Thought Content: Thought content normal.         Judgment: Judgment normal.         Assessment/Plan:    Chi Lacy was seen today for follow-up.     Diagnoses and all orders for this visit:    Prediabetes  -     Hemoglobin A1C; Future    Acquired hypothyroidism  -     T4, Free; Future  -     TSH; Future    Essential (primary) hypertension  Recommended low sodium diet; Goal: take meds as prescribed, monitor blood pressure at home and call with readings. -     Urinalysis; Future    Mixed hyperlipidemia  -     CBC with Auto Differential; Future  -     Comprehensive Metabolic Panel; Future    Moderate episode of recurrent major depressive disorder (HCC)  Stable on present medications, continue as prescribed. He is taking Zoloft and feeling well; Anxiety, generalized  -     diazePAM (VALIUM) 5 MG tablet; Take 1 tablet by mouth every 8 hours as needed for Anxiety for up to 30 days. Vitamin D insufficiency  -     Vitamin D 25 Hydroxy; Future        Follow-up and Dispositions    Return if symptoms worsen or fail to improve, for f/u; schedule fasting labs only next week. Medication Reconciliation:  Current Medications Verified: Current medications/ immunizations were reviewed, including purpose, with patient. Family History, Social History, Current and Past Medical History was reviewed with patient and updated at today's office visit. Medication Reconciliation list was given to patient/ family. Patient was advised to discard old medication lists and provide all providers with current list at each visit and carry list with them in case of emergency.     Completed By:   Butch Buckley MD    [facility]  [facility], [facility] [place], [address] [facility]  [phone]  [phone] fax  11:49 AM

## 2022-12-16 DIAGNOSIS — E55.9 VITAMIN D INSUFFICIENCY: ICD-10-CM

## 2022-12-16 DIAGNOSIS — R73.03 PREDIABETES: ICD-10-CM

## 2022-12-16 DIAGNOSIS — E78.2 MIXED HYPERLIPIDEMIA: ICD-10-CM

## 2022-12-16 DIAGNOSIS — I10 ESSENTIAL (PRIMARY) HYPERTENSION: ICD-10-CM

## 2022-12-16 DIAGNOSIS — E03.9 ACQUIRED HYPOTHYROIDISM: ICD-10-CM

## 2022-12-16 LAB
APPEARANCE UR: CLEAR
BASOPHILS # BLD: 0 K/UL (ref 0–0.2)
BASOPHILS NFR BLD: 1 % (ref 0–2)
BILIRUB UR QL: NEGATIVE
COLOR UR: NORMAL
DIFFERENTIAL METHOD BLD: ABNORMAL
EOSINOPHIL # BLD: 0.2 K/UL (ref 0–0.8)
EOSINOPHIL NFR BLD: 3 % (ref 0.5–7.8)
ERYTHROCYTE [DISTWIDTH] IN BLOOD BY AUTOMATED COUNT: 13.2 % (ref 11.9–14.6)
GLUCOSE UR STRIP.AUTO-MCNC: NEGATIVE MG/DL
HCT VFR BLD AUTO: 50.5 % (ref 41.1–50.3)
HGB BLD-MCNC: 16.5 G/DL (ref 13.6–17.2)
HGB UR QL STRIP: NEGATIVE
IMM GRANULOCYTES # BLD AUTO: 0 K/UL (ref 0–0.5)
IMM GRANULOCYTES NFR BLD AUTO: 0 % (ref 0–5)
KETONES UR QL STRIP.AUTO: NEGATIVE MG/DL
LEUKOCYTE ESTERASE UR QL STRIP.AUTO: NEGATIVE
LYMPHOCYTES # BLD: 2.2 K/UL (ref 0.5–4.6)
LYMPHOCYTES NFR BLD: 35 % (ref 13–44)
MCH RBC QN AUTO: 29.5 PG (ref 26.1–32.9)
MCHC RBC AUTO-ENTMCNC: 32.7 G/DL (ref 31.4–35)
MCV RBC AUTO: 90.2 FL (ref 82–102)
MONOCYTES # BLD: 0.5 K/UL (ref 0.1–1.3)
MONOCYTES NFR BLD: 8 % (ref 4–12)
NEUTS SEG # BLD: 3.4 K/UL (ref 1.7–8.2)
NEUTS SEG NFR BLD: 53 % (ref 43–78)
NITRITE UR QL STRIP.AUTO: NEGATIVE
NRBC # BLD: 0 K/UL (ref 0–0.2)
PH UR STRIP: 6 (ref 5–9)
PLATELET # BLD AUTO: 266 K/UL (ref 150–450)
PMV BLD AUTO: 10.1 FL (ref 9.4–12.3)
PROT UR STRIP-MCNC: NEGATIVE MG/DL
RBC # BLD AUTO: 5.6 M/UL (ref 4.23–5.6)
SP GR UR REFRACTOMETRY: 1.02 (ref 1–1.02)
UROBILINOGEN UR QL STRIP.AUTO: 0.2 EU/DL (ref 0.2–1)
WBC # BLD AUTO: 6.4 K/UL (ref 4.3–11.1)

## 2022-12-17 LAB
25(OH)D3 SERPL-MCNC: 20.2 NG/ML (ref 30–100)
ALBUMIN SERPL-MCNC: 4.3 G/DL (ref 3.5–5)
ALBUMIN/GLOB SERPL: 1.4 (ref 0.4–1.6)
ALP SERPL-CCNC: 72 U/L (ref 50–136)
ALT SERPL-CCNC: 27 U/L (ref 12–65)
ANION GAP SERPL CALC-SCNC: 10 MMOL/L (ref 2–11)
AST SERPL-CCNC: 22 U/L (ref 15–37)
BILIRUB SERPL-MCNC: 0.6 MG/DL (ref 0.2–1.1)
BUN SERPL-MCNC: 11 MG/DL (ref 6–23)
CALCIUM SERPL-MCNC: 9.8 MG/DL (ref 8.3–10.4)
CHLORIDE SERPL-SCNC: 106 MMOL/L (ref 101–110)
CO2 SERPL-SCNC: 24 MMOL/L (ref 21–32)
CREAT SERPL-MCNC: 1 MG/DL (ref 0.8–1.5)
EST. AVERAGE GLUCOSE BLD GHB EST-MCNC: 114 MG/DL
GLOBULIN SER CALC-MCNC: 3 G/DL (ref 2.8–4.5)
GLUCOSE SERPL-MCNC: 98 MG/DL (ref 65–100)
HBA1C MFR BLD: 5.6 % (ref 4.8–5.6)
POTASSIUM SERPL-SCNC: 4.6 MMOL/L (ref 3.5–5.1)
PROT SERPL-MCNC: 7.3 G/DL (ref 6.3–8.2)
SODIUM SERPL-SCNC: 140 MMOL/L (ref 133–143)
T4 FREE SERPL-MCNC: 1 NG/DL (ref 0.78–1.46)
TSH, 3RD GENERATION: 2.99 UIU/ML (ref 0.36–3.74)

## 2023-01-30 ENCOUNTER — OFFICE VISIT (OUTPATIENT)
Dept: BEHAVIORAL/MENTAL HEALTH CLINIC | Age: 43
End: 2023-01-30
Payer: MEDICAID

## 2023-01-30 VITALS
SYSTOLIC BLOOD PRESSURE: 164 MMHG | DIASTOLIC BLOOD PRESSURE: 98 MMHG | HEIGHT: 70 IN | WEIGHT: 214 LBS | BODY MASS INDEX: 30.64 KG/M2 | OXYGEN SATURATION: 99 % | HEART RATE: 81 BPM

## 2023-01-30 DIAGNOSIS — F41.0 PANIC DISORDER: Primary | ICD-10-CM

## 2023-01-30 PROCEDURE — 3078F DIAST BP <80 MM HG: CPT | Performed by: STUDENT IN AN ORGANIZED HEALTH CARE EDUCATION/TRAINING PROGRAM

## 2023-01-30 PROCEDURE — 3074F SYST BP LT 130 MM HG: CPT | Performed by: STUDENT IN AN ORGANIZED HEALTH CARE EDUCATION/TRAINING PROGRAM

## 2023-01-30 PROCEDURE — 99213 OFFICE O/P EST LOW 20 MIN: CPT | Performed by: STUDENT IN AN ORGANIZED HEALTH CARE EDUCATION/TRAINING PROGRAM

## 2023-01-30 RX ORDER — FLUOXETINE 10 MG/1
10 CAPSULE ORAL DAILY
Qty: 30 CAPSULE | Refills: 2 | Status: SHIPPED | OUTPATIENT
Start: 2023-01-30

## 2023-01-30 ASSESSMENT — PATIENT HEALTH QUESTIONNAIRE - PHQ9
SUM OF ALL RESPONSES TO PHQ QUESTIONS 1-9: 0
SUM OF ALL RESPONSES TO PHQ QUESTIONS 1-9: 0
1. LITTLE INTEREST OR PLEASURE IN DOING THINGS: 0
SUM OF ALL RESPONSES TO PHQ QUESTIONS 1-9: 0
2. FEELING DOWN, DEPRESSED OR HOPELESS: 0
SUM OF ALL RESPONSES TO PHQ QUESTIONS 1-9: 0
SUM OF ALL RESPONSES TO PHQ9 QUESTIONS 1 & 2: 0

## 2023-01-30 NOTE — PROGRESS NOTES
Tameka       Patient Name: Hamlet Cloud    Patient : 1980    Patient MRN: [id number]    Insurance: [employer]    Primary Language: [place]      Date of Service: 2023    Type of Service: Medication management    Other Services Involved: N/A       Phone Number: [phone]   Emergency Contact: Nela Miranda, partner, [phone]       Chief Complaint: \"Not as good\"      History of Present Illness    Hamlet Cloud is a 43 y.o. male with reported prior psychiatric history significant for anxiety who presents for medication management. Pt reports that they are currently prescribed: Zoloft 75 mg QHS, Valium 5 mg BID PRN (typically takes twice daily). Pt reports that he has not been doing well since last appt, stating that he thinks that his symptoms may have worsened following increase. Reports that he began to experience more frequent panic attacks in addition to feeling depersonalization, even without having active panic symptoms. States that he reduced his dose back to 25 mg for several weeks and has since discontinued for about 5 days. Otherwise denies SI/HI, A/VH, paranoia or delusions. He is open to trial of alternative medication. Last Visit (22): Pt reports that he feels like he is doing \"a little better\" regarding his anxiety since transitioning to Zoloft. He thinks that it has helped with his intrusive thoughts about somatic symptoms, stating that he tends to dwell on them less. Reports that he did not increase his dose beyond 50 mg due to his concerns regarding too much medication. States that he missed the initial call for therapy referral and is open to following up. Reports compliance with medication and experienced mild dizziness when he first started Zoloft, but this has since improved. Denies SI/HI, A/VH, paranoia or delusions. Psychiatric Review of Systems    Depression: Denies depressed mood and anhedonia.  Expresses that he cannot recall a time in which he experienced symptoms of clinical depression for an extended period of time. Denies current or active SI/HI. Anxiety: Denies generalized worries or difficulty controlling these . Reports that the only thing that bothers him frequently is a \"health anxiety,\" stating that he is often concerned about his health, but does not necessarily seek frequent medical treatment. States that his heart is of particular concern due to a hx of recurrent PVCs, but has had prior cardiac w/u w/o significant concern. Of note, he shares that he had multiple family members die around the time he first began experiencing the above anxiety, and thinks it may contribute to his anxiety. Panic: reports hx of panic since childhood; states that he will initially experience a sensation of dizziness, fear of something bad happening, hyperventilation, shaking, etc. Generally lasts 10-15 min, but will sometimes fixate on his symptoms for up to an hour. Reports avoidance behaviors due to concern of having a panic attack and being unable to escape. Denies agoraphobia. Hypomania/yanna: denies prior hx of distinct periods of elevated mood, grandiosity, decreased need for sleep, etc.    Psychosis: denies hallucinations, delusions, disorganized speech, and disorganized or catatonic behaviors    Trauma: denies re-experiencing, avoidance behaviors, hypervigilance or reactivity, negative self-concept, affect dysregulation, and interpersonal difficulties related to prior traumatic event         Psychiatric History    Please see prior records. New medication trials: Zoloft      Family History    Please see prior records. No updates at this time. Social History    Please see prior records. No updates at this time. Substance Abuse History    Please see prior records. No updates at this time.         Past Medical History:    Past Medical History:   Diagnosis Date    Anxiety     Depression Hypertension     Hypertriglyceridemia             Allergies:    Patient has no known allergies. Current Home Medications:    Current Outpatient Medications   Medication Instructions    atenolol (TENORMIN) 100 MG tablet 1/2 tablet by mouth BID    fenofibrate (TRICOR) 145 mg, Oral, DAILY    Icosapent Ethyl (VASCEPA) 1 g CAPS capsule 2 capsules, Oral, DAILY    sertraline (ZOLOFT) 75 mg, Oral, DAILY    valsartan (DIOVAN) 80 mg, Oral, DAILY         PDMP:  Last reviewed: 10/4/22    09/23/2022 08/24/2022 1   Diazepam 5 Mg Tablet  60.00 30 [employer] [id number] Chastity ([id number]) 1/2 1.00 [place]  08/24/2022 08/24/2022 1   Diazepam 5 Mg Tablet  60.00 30 [employer] [id number] Chastity ([id number]) 0/2 1.00 [place]  07/25/2022 05/25/2022 1   Diazepam 5 Mg Tablet  60.00 30 [employer] [id number] Chastity ([id number]) 2/2 1.00 [place]  06/24/2022 05/25/2022 1   Diazepam 5 Mg Tablet  60.00 30 [employer] [id number] Chastity ([id number]) 1/2 1.00 [place]  05/25/2022 05/25/2022 1   Diazepam 5 Mg Tablet  60.00 30 [employer] [id number] Chastity ([id number]) 0/2 1.00 [place]  04/26/2022 02/25/2022 1   Diazepam 5 Mg Tablet  60.00 30 [employer] [id number] Chastity ([id number]) 2/2 1.00 [place]       Review of systems    Constitutional: denies significant weight loss/gain, fatigue    HEENT: denies rhinorrhea, sore throat. Respiratory: denies cough, shortness of breath    Cardiovascular: denies chest pain    GI: +D; denies N/V/C, abdominal pain. MSK: denies joint pain, muscle stiffness/soreness, back pain, neck pain. Neuro: denies HA, dizziness. Psychiatric: as above and below.        Vital Signs:     Temp Readings from Last 3 Encounters:   12/02/22 98.3 °F (36.8 °C) (Oral)   10/04/22 98.1 °F (36.7 °C) (Oral)       BP Readings from Last 3 Encounters:   01/30/23 (!) 164/98   12/02/22 (!) 134/96   10/19/22 (!) 160/102       Pulse Readings from Last 3 Encounters:   01/30/23 81   12/02/22 75   10/19/22 90          Lab Results:     Lab Results   Component Value Date/Time    WBC 6.4 12/16/2022 11:47 AM    HGB 16.5 12/16/2022 11:47 AM    HCT 50.5 12/16/2022 11:47 AM    MCV 90.2 12/16/2022 11:47 AM    MCH 29.5 12/16/2022 11:47 AM    MCHC 32.7 12/16/2022 11:47 AM    RDW 13.2 12/16/2022 11:47 AM    MPV 10.1 12/16/2022 11:47 AM    MONOPCT 8 12/16/2022 11:47 AM    MONOPCT 9 11/03/2020 09:47 AM    BASOPCT 1 12/16/2022 11:47 AM    BASOPCT 1 11/03/2020 09:47 AM    DIFFTYPE AUTOMATED 12/16/2022 11:47 AM         Lab Results   Component Value Date    TRIG 470 (H) 11/22/2022    HDL 34 (L) 11/22/2022    CHOL 221 (H) 11/22/2022    GLUCOSE 98 12/16/2022        All pertinent/available labs reviewed. Mental Status Examination    General/Appearance: Appears stated age, Well-kept, and Appropriately attired; wearing glasses    Behavior: cooperative, engaged, nervous    Eye Contact: fair    Psychomotor: Within normal limits, though restless    Musculoskeletal: gait wnl    Speech/Language: Within normal limits    Mood: \"a little worse\"    Affect: Appropriate, Congruent, and Restricted range, though able to smile appropriately    Thought process: linear, goal directed, and coherent    Thought content: denies SI/HI, A/VH, paranoia or delusions    Orientation: oriented to person, place, and time    Memory: Intact long-term and Intact short-term    Attention/Concentration: Sustained    Fund of knowledge: fair    Judgement: fair    Insight: fair         Questionnaire Findings:    PHQ2: 0 (1/30/23)    GAD7: 13 (10/4/22)         Assessment/Summary of Findings:    Marylu Zuleta is a 43 y.o. male with reported prior psychiatric history significant for anxiety who presents for medication management. Pt reports that they are currently prescribed: Zoloft 75 mg QHS, Valium 5 mg BID PRN (typically takes twice daily). Pt reports that he experienced a worsening of symptoms following increase of Zoloft to 75 mg, stating that he was unable to tolerate due to SE.  He reports that he reduced his dose back to 25 mg for a period of time, but ultimately discontinued about 5 days prior to appt. He remains open to trial of alternative medication. Denies SI/HI, A/VH, paranoia or delusions. He is scheduled for therapy intake on 5/24/23. Discussed trial of Prozac and will f/u in 1 mo. Diagnosis:   Panic disorder  Alcohol use disorder, moderate       Plan:    Aston Kahn will receive medication management, therapy at [facility]. Medication Recommendations:    - Pt discontinued Zoloft due to reported SE    - Start Prozac 10 mg daily for anxiety    - Continue Valium 5 mg BID PRN, but discussed minimizing use, especially around EtOH in effort to reduce overall risk    - Medication side effect profiles, risks, and benefits were discussed with the patient. - Patient encouraged to contact the clinic if experiencing any adverse reactions with medications. Other Recommendations:    - Scheduled for therapy intake on 5/24/23 with Ana Colin    - If patient has any concerns for their own safety due to adverse reactions to medications, suicidal or homicidal ideations, auditory or visual hallucinations, or delusions, they have been told to call 911 or go to the nearest emergency department.       RTC: 1 mo        Chayito Rojas MD    2/3/2023 9:28 AM    [facility],[address]

## 2023-02-27 ENCOUNTER — OFFICE VISIT (OUTPATIENT)
Dept: BEHAVIORAL/MENTAL HEALTH CLINIC | Age: 43
End: 2023-02-27
Payer: MEDICAID

## 2023-02-27 VITALS — HEART RATE: 78 BPM | DIASTOLIC BLOOD PRESSURE: 86 MMHG | SYSTOLIC BLOOD PRESSURE: 132 MMHG | OXYGEN SATURATION: 98 %

## 2023-02-27 DIAGNOSIS — F41.0 PANIC DISORDER: ICD-10-CM

## 2023-02-27 PROCEDURE — 99213 OFFICE O/P EST LOW 20 MIN: CPT | Performed by: STUDENT IN AN ORGANIZED HEALTH CARE EDUCATION/TRAINING PROGRAM

## 2023-02-27 PROCEDURE — 3078F DIAST BP <80 MM HG: CPT | Performed by: STUDENT IN AN ORGANIZED HEALTH CARE EDUCATION/TRAINING PROGRAM

## 2023-02-27 PROCEDURE — 3074F SYST BP LT 130 MM HG: CPT | Performed by: STUDENT IN AN ORGANIZED HEALTH CARE EDUCATION/TRAINING PROGRAM

## 2023-02-27 RX ORDER — FLUOXETINE HYDROCHLORIDE 20 MG/1
20 CAPSULE ORAL DAILY
Qty: 30 CAPSULE | Refills: 2 | Status: SHIPPED | OUTPATIENT
Start: 2023-02-27

## 2023-02-27 NOTE — PROGRESS NOTES
Tameka       Patient Name: Paola Campoverde    Patient : 1980    Patient MRN: [id number]    Insurance: [employer]    Primary Language: English      Date of Service: 2023    Type of Service: Medication management    Other Services Involved: N/A       Phone Number: [phone]   Emergency Contact: Fiona Bennett, partner, [phone]       Chief Complaint: \"I think the Prozac has been working better than the Zoloft\"      History of Present Illness    Paola Campoverde is a 43 y.o. male with reported prior psychiatric history significant for anxiety who presents for medication management. Pt reports that they are currently prescribed: Prozac 10 mg daily, Valium 5 mg BID PRN (typically takes twice daily). Pt reports that his panic attacks have been less severe since starting Prozac, expressing that he also has seen a reduction in his baseline anxiety. Reports compliance with medications and denies significant SE other than mild nausea/HA that are not consistent. Denies SI/HI, A/VH, paranoia or delusions. He is comfortable with ongoing titration. Last Visit (22): Pt reports that he has not been doing well since last appt, stating that he thinks that his symptoms may have worsened following increase. Reports that he began to experience more frequent panic attacks in addition to feeling depersonalization, even without having active panic symptoms. States that he reduced his dose back to 25 mg for several weeks and has since discontinued for about 5 days. Otherwise denies SI/HI, A/VH, paranoia or delusions. He is open to trial of alternative medication. Psychiatric Review of Systems    Depression: Denies depressed mood and anhedonia. Expresses that he cannot recall a time in which he experienced symptoms of clinical depression for an extended period of time. Denies current or active SI/HI.     Anxiety: Denies generalized worries or difficulty controlling these . Reports that the only thing that bothers him frequently is a \"health anxiety,\" stating that he is often concerned about his health, but does not necessarily seek frequent medical treatment. States that his heart is of particular concern due to a hx of recurrent PVCs, but has had prior cardiac w/u w/o significant concern. Of note, he shares that he had multiple family members die around the time he first began experiencing the above anxiety, and thinks it may contribute to his anxiety. Panic: reports hx of panic since childhood; states that he will initially experience a sensation of dizziness, fear of something bad happening, hyperventilation, shaking, etc. Generally lasts 10-15 min, but will sometimes fixate on his symptoms for up to an hour. Reports avoidance behaviors due to concern of having a panic attack and being unable to escape. Denies agoraphobia. Hypomania/yanna: denies prior hx of distinct periods of elevated mood, grandiosity, decreased need for sleep, etc.    Psychosis: denies hallucinations, delusions, disorganized speech, and disorganized or catatonic behaviors    Trauma: denies re-experiencing, avoidance behaviors, hypervigilance or reactivity, negative self-concept, affect dysregulation, and interpersonal difficulties related to prior traumatic event         Psychiatric History    Please see prior records. New medication trials: Prozac      Family History    Please see prior records. No updates at this time. Social History    Please see prior records. No updates at this time. Substance Abuse History    Please see prior records. No updates at this time. Past Medical History:    Past Medical History:   Diagnosis Date    Anxiety     Depression     Hypertension     Hypertriglyceridemia             Allergies:    Patient has no known allergies.          Current Home Medications:    Current Outpatient Medications   Medication Instructions atenolol (TENORMIN) 100 MG tablet 1/2 tablet by mouth BID    fenofibrate (TRICOR) 145 mg, Oral, DAILY    FLUoxetine (PROZAC) 10 mg, Oral, DAILY         PDMP:  Last reviewed: 10/4/22    09/23/2022 08/24/2022 1   Diazepam 5 Mg Tablet  60.00 30 [employer] [id number] Chastity ([id number]) 1/2 1.00 [place]  08/24/2022 08/24/2022 1   Diazepam 5 Mg Tablet  60.00 30 [place] [id number] Chastity ([id number]) 0/2 1.00 [place]  07/25/2022 05/25/2022 1   Diazepam 5 Mg Tablet  60.00 30 [employer] [id number] Chastity ([id number]) 2/2 1.00 [place]  06/24/2022 05/25/2022 1   Diazepam 5 Mg Tablet  60.00 30 [place] [id number] Chastity ([id number]) 1/2 1.00 [place]  05/25/2022 05/25/2022 1   Diazepam 5 Mg Tablet  60.00 30 [place] [id number] Chastity ([id number]) 0/2 1.00 [place]  04/26/2022 02/25/2022 1   Diazepam 5 Mg Tablet  60.00 30 [employer] [id number] Chastity ([id number]) 2/2 1.00 [place]       Review of systems    Constitutional: denies significant weight loss/gain, fatigue    HEENT: denies rhinorrhea, sore throat. Respiratory: denies cough, shortness of breath    Cardiovascular: denies chest pain    GI: +D; denies N/V/C, abdominal pain. MSK: denies joint pain, muscle stiffness/soreness, back pain, neck pain. Neuro: denies HA, dizziness. Psychiatric: as above and below.        Vital Signs:     Temp Readings from Last 3 Encounters:   12/02/22 98.3 °F (36.8 °C) (Oral)   10/04/22 98.1 °F (36.7 °C) (Oral)       BP Readings from Last 3 Encounters:   02/27/23 132/86   01/30/23 (!) 164/98   12/02/22 (!) 134/96       Pulse Readings from Last 3 Encounters:   02/27/23 78   01/30/23 81   12/02/22 75          Lab Results:     Lab Results   Component Value Date/Time    WBC 6.4 12/16/2022 11:47 AM    HGB 16.5 12/16/2022 11:47 AM    HCT 50.5 12/16/2022 11:47 AM    MCV 90.2 12/16/2022 11:47 AM    MCH 29.5 12/16/2022 11:47 AM    MCHC 32.7 12/16/2022 11:47 AM    RDW 13.2 12/16/2022 11:47 AM    MPV 10.1 12/16/2022 11:47 AM    MONOPCT 8 12/16/2022 11:47 AM    MONOPCT 9 11/03/2020 09:47 AM    BASOPCT 1 12/16/2022 11:47 AM    BASOPCT 1 11/03/2020 09:47 AM    DIFFTYPE AUTOMATED 12/16/2022 11:47 AM         Lab Results   Component Value Date    TRIG 470 (H) 11/22/2022    HDL 34 (L) 11/22/2022    CHOL 221 (H) 11/22/2022    GLUCOSE 98 12/16/2022        All pertinent/available labs reviewed. Mental Status Examination    General/Appearance: Appears stated age, Well-kept, and Appropriately attired; wearing glasses    Behavior: cooperative, engaged, nervous    Eye Contact: fair    Psychomotor: Within normal limits, though restless    Musculoskeletal: gait wnl    Speech/Language: Within normal limits    Mood: \"a little better\"    Affect: Appropriate, Congruent, and Restricted range, though able to smile appropriately    Thought process: linear, goal directed, and coherent    Thought content: denies SI/HI, A/VH, paranoia or delusions    Orientation: oriented to person, place, and time    Memory: Intact long-term and Intact short-term    Attention/Concentration: Sustained    Fund of knowledge: fair    Judgement: fair    Insight: fair         Questionnaire Findings:    PHQ2: 0 (1/30/23)    GAD7: 13 (10/4/22)         Assessment/Summary of Findings:    Aron Auguste is a 43 y.o. male with reported prior psychiatric history significant for anxiety who presents for medication management. Pt reports that they are currently prescribed: Prozac 20 mg daily, Valium 5 mg BID PRN (typically takes twice daily). Pt reports that he could appreciate an overall improvement in his anxiety, expressing that the frequency and severity of panic attacks have decreased in addition to overall levels of general anxiety. Reports compliance with medications and denies SE. Denies SI/HI, A/VH, paranoia or delusions. Discussed ongoing titration of Prozac and will f/u in 1 mo.        Diagnosis:   Panic disorder  Alcohol use disorder, moderate       Plan:    Aron Auguste will receive medication management, therapy at [address] [place] [address] [facility]. Medication Recommendations:    - Increase Prozac to 20 mg daily for anxiety    - Continue Valium 5 mg BID PRN, but discussed minimizing use, especially around EtOH in effort to reduce overall risk    - Medication side effect profiles, risks, and benefits were discussed with the patient. - Patient encouraged to contact the clinic if experiencing any adverse reactions with medications. Other Recommendations:    - Scheduled for therapy intake on 5/24/23 with Franchesca Bustillo    - If patient has any concerns for their own safety due to adverse reactions to medications, suicidal or homicidal ideations, auditory or visual hallucinations, or delusions, they have been told to call 911 or go to the nearest emergency department.       RTC: 1 mo        Jackie Melo MD    3/2/2023 10:09 AM    [address] [facility]

## 2023-03-07 DIAGNOSIS — F41.1 GENERALIZED ANXIETY DISORDER: ICD-10-CM

## 2023-03-07 DIAGNOSIS — F33.1 MAJOR DEPRESSIVE DISORDER, RECURRENT, MODERATE (HCC): ICD-10-CM

## 2023-03-07 DIAGNOSIS — F41.1 ANXIETY, GENERALIZED: ICD-10-CM

## 2023-03-07 RX ORDER — DIAZEPAM 5 MG/1
5 TABLET ORAL EVERY 8 HOURS PRN
Qty: 90 TABLET | Refills: 2 | Status: SHIPPED | OUTPATIENT
Start: 2023-03-07 | End: 2023-04-06

## 2023-03-07 RX ORDER — CITALOPRAM 40 MG/1
TABLET ORAL
Qty: 30 TABLET | Refills: 11 | OUTPATIENT
Start: 2023-03-07

## 2023-04-13 ENCOUNTER — OFFICE VISIT (OUTPATIENT)
Dept: BEHAVIORAL/MENTAL HEALTH CLINIC | Age: 43
End: 2023-04-13

## 2023-04-13 VITALS
DIASTOLIC BLOOD PRESSURE: 68 MMHG | HEIGHT: 70 IN | HEART RATE: 70 BPM | SYSTOLIC BLOOD PRESSURE: 112 MMHG | OXYGEN SATURATION: 97 % | WEIGHT: 210 LBS | BODY MASS INDEX: 30.06 KG/M2

## 2023-04-13 DIAGNOSIS — F41.0 PANIC DISORDER: Primary | ICD-10-CM

## 2023-04-13 RX ORDER — FLUOXETINE 10 MG/1
10 CAPSULE ORAL DAILY
Qty: 90 CAPSULE | Refills: 1 | Status: SHIPPED | OUTPATIENT
Start: 2023-04-13

## 2023-04-13 RX ORDER — DIAZEPAM 5 MG/1
5 TABLET ORAL 3 TIMES DAILY PRN
COMMUNITY

## 2023-05-23 NOTE — PROGRESS NOTES
Length of meeting; 52    minutes    Start Time: 7645    Stop Time: 1050    Diagnosis:  Generalized anxiety disorder with panic attacks. Treatment Plan: This is pended to next session as we will plan to review treatment goals at that time; the patient is given an assignment related to goal setting in session today. Patient Prognosis: Guarded at present time. Patient Progress: This is an initial visit for this patient who is referred by Dr. Ngozi CA) for his panic disorder. The medical record is reviewed prior to this visit today. Dr. 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 His panic attacks started around age 15 when several family members  and he is noted to have a fear of a general health anxiety. His alcohol use of 4-6 beers per day is also noted; he apparently is treating his anxiety with this as well. He would like to reduce this. Today, he indicates that his anxiety is his major issue, along with the panic attacks. We talked about his health concerns being related to his anxiety as well, which appear to be prominent. Mental Status exam: PHQ-9 is done with a score of 11; his PHQ-2 was a 2;  GLO-7 is done with a score of 16. Both are shared with the patient. The patient indicates his alcohol use is 4-6 beers a day, and that his drug use is none  . No current active SI or HI. The patient agrees today that if thoughts of self-harm or harm of others begins to occur that he will call 911, or go to the nearest ER or [facility], prior to acting on these thoughts. Thought processes appear normal.  Speech is goal directed. The patient does not appear to be responding to internal stimuli. Plan: I will see this patient again in about 3-4 weeks.     SENIA Maurer

## 2023-05-24 ENCOUNTER — OFFICE VISIT (OUTPATIENT)
Dept: BEHAVIORAL/MENTAL HEALTH CLINIC | Age: 43
End: 2023-05-24
Payer: MEDICAID

## 2023-05-24 DIAGNOSIS — F41.1 GENERALIZED ANXIETY DISORDER WITH PANIC ATTACKS: Primary | ICD-10-CM

## 2023-05-24 DIAGNOSIS — F41.0 GENERALIZED ANXIETY DISORDER WITH PANIC ATTACKS: Primary | ICD-10-CM

## 2023-05-24 PROCEDURE — 90791 PSYCH DIAGNOSTIC EVALUATION: CPT | Performed by: SOCIAL WORKER

## 2023-05-24 ASSESSMENT — PATIENT HEALTH QUESTIONNAIRE - PHQ9
6. FEELING BAD ABOUT YOURSELF - OR THAT YOU ARE A FAILURE OR HAVE LET YOURSELF OR YOUR FAMILY DOWN: 0
7. TROUBLE CONCENTRATING ON THINGS, SUCH AS READING THE NEWSPAPER OR WATCHING TELEVISION: 3
SUM OF ALL RESPONSES TO PHQ QUESTIONS 1-9: 10
2. FEELING DOWN, DEPRESSED OR HOPELESS: 1
4. FEELING TIRED OR HAVING LITTLE ENERGY: 3
9. THOUGHTS THAT YOU WOULD BE BETTER OFF DEAD, OR OF HURTING YOURSELF: 1
SUM OF ALL RESPONSES TO PHQ QUESTIONS 1-9: 11
8. MOVING OR SPEAKING SO SLOWLY THAT OTHER PEOPLE COULD HAVE NOTICED. OR THE OPPOSITE, BEING SO FIGETY OR RESTLESS THAT YOU HAVE BEEN MOVING AROUND A LOT MORE THAN USUAL: 0
3. TROUBLE FALLING OR STAYING ASLEEP: 2
5. POOR APPETITE OR OVEREATING: 0
1. LITTLE INTEREST OR PLEASURE IN DOING THINGS: 1
SUM OF ALL RESPONSES TO PHQ QUESTIONS 1-9: 11
SUM OF ALL RESPONSES TO PHQ QUESTIONS 1-9: 11
SUM OF ALL RESPONSES TO PHQ9 QUESTIONS 1 & 2: 2
10. IF YOU CHECKED OFF ANY PROBLEMS, HOW DIFFICULT HAVE THESE PROBLEMS MADE IT FOR YOU TO DO YOUR WORK, TAKE CARE OF THINGS AT HOME, OR GET ALONG WITH OTHER PEOPLE: 1

## 2023-05-24 ASSESSMENT — ANXIETY QUESTIONNAIRES
1. FEELING NERVOUS, ANXIOUS, OR ON EDGE: 3
GAD7 TOTAL SCORE: 16
2. NOT BEING ABLE TO STOP OR CONTROL WORRYING: 2
6. BECOMING EASILY ANNOYED OR IRRITABLE: 2
7. FEELING AFRAID AS IF SOMETHING AWFUL MIGHT HAPPEN: 3
3. WORRYING TOO MUCH ABOUT DIFFERENT THINGS: 2
IF YOU CHECKED OFF ANY PROBLEMS ON THIS QUESTIONNAIRE, HOW DIFFICULT HAVE THESE PROBLEMS MADE IT FOR YOU TO DO YOUR WORK, TAKE CARE OF THINGS AT HOME, OR GET ALONG WITH OTHER PEOPLE: VERY DIFFICULT
4. TROUBLE RELAXING: 3
5. BEING SO RESTLESS THAT IT IS HARD TO SIT STILL: 1

## 2023-05-24 ASSESSMENT — COLUMBIA-SUICIDE SEVERITY RATING SCALE - C-SSRS
6. HAVE YOU EVER DONE ANYTHING, STARTED TO DO ANYTHING, OR PREPARED TO DO ANYTHING TO END YOUR LIFE?: NO
2. HAVE YOU ACTUALLY HAD ANY THOUGHTS OF KILLING YOURSELF?: NO
1. WITHIN THE PAST MONTH, HAVE YOU WISHED YOU WERE DEAD OR WISHED YOU COULD GO TO SLEEP AND NOT WAKE UP?: YES

## 2023-05-31 ENCOUNTER — OFFICE VISIT (OUTPATIENT)
Dept: INTERNAL MEDICINE CLINIC | Facility: CLINIC | Age: 43
End: 2023-05-31
Payer: MEDICAID

## 2023-05-31 VITALS
HEIGHT: 70 IN | TEMPERATURE: 97.3 F | HEART RATE: 76 BPM | DIASTOLIC BLOOD PRESSURE: 82 MMHG | WEIGHT: 208 LBS | SYSTOLIC BLOOD PRESSURE: 140 MMHG | BODY MASS INDEX: 29.78 KG/M2 | OXYGEN SATURATION: 98 %

## 2023-05-31 DIAGNOSIS — K58.0 IRRITABLE BOWEL SYNDROME WITH DIARRHEA: Primary | ICD-10-CM

## 2023-05-31 DIAGNOSIS — E78.2 MIXED HYPERLIPIDEMIA: ICD-10-CM

## 2023-05-31 DIAGNOSIS — K64.8 OTHER HEMORRHOIDS: ICD-10-CM

## 2023-05-31 DIAGNOSIS — F41.1 GENERALIZED ANXIETY DISORDER: ICD-10-CM

## 2023-05-31 DIAGNOSIS — K58.0 IRRITABLE BOWEL SYNDROME WITH DIARRHEA: ICD-10-CM

## 2023-05-31 DIAGNOSIS — I10 ESSENTIAL (PRIMARY) HYPERTENSION: ICD-10-CM

## 2023-05-31 PROCEDURE — 3079F DIAST BP 80-89 MM HG: CPT | Performed by: NURSE PRACTITIONER

## 2023-05-31 PROCEDURE — 99214 OFFICE O/P EST MOD 30 MIN: CPT | Performed by: NURSE PRACTITIONER

## 2023-05-31 PROCEDURE — 3077F SYST BP >= 140 MM HG: CPT | Performed by: NURSE PRACTITIONER

## 2023-05-31 RX ORDER — HYDROCORTISONE ACETATE 25 MG/1
25 SUPPOSITORY RECTAL EVERY 12 HOURS
Qty: 12 SUPPOSITORY | Refills: 1 | Status: SHIPPED | OUTPATIENT
Start: 2023-05-31

## 2023-05-31 RX ORDER — ATENOLOL 100 MG/1
TABLET ORAL
Qty: 30 TABLET | Refills: 5 | OUTPATIENT
Start: 2023-05-31

## 2023-05-31 RX ORDER — FENOFIBRATE 145 MG/1
145 TABLET, COATED ORAL DAILY
Qty: 30 TABLET | Refills: 5 | Status: SHIPPED | OUTPATIENT
Start: 2023-05-31

## 2023-05-31 RX ORDER — DIAZEPAM 5 MG/1
5 TABLET ORAL 3 TIMES DAILY PRN
Qty: 90 TABLET | Refills: 2 | Status: SHIPPED | OUTPATIENT
Start: 2023-05-31 | End: 2023-08-29

## 2023-05-31 RX ORDER — FENOFIBRATE 145 MG/1
TABLET, COATED ORAL
Qty: 30 TABLET | Refills: 5 | OUTPATIENT
Start: 2023-05-31

## 2023-05-31 RX ORDER — ATENOLOL 100 MG/1
TABLET ORAL
Qty: 30 TABLET | Refills: 5 | Status: SHIPPED | OUTPATIENT
Start: 2023-05-31

## 2023-05-31 SDOH — ECONOMIC STABILITY: HOUSING INSECURITY
IN THE LAST 12 MONTHS, WAS THERE A TIME WHEN YOU DID NOT HAVE A STEADY PLACE TO SLEEP OR SLEPT IN A SHELTER (INCLUDING NOW)?: NO

## 2023-05-31 SDOH — ECONOMIC STABILITY: FOOD INSECURITY: WITHIN THE PAST 12 MONTHS, THE FOOD YOU BOUGHT JUST DIDN'T LAST AND YOU DIDN'T HAVE MONEY TO GET MORE.: NEVER TRUE

## 2023-05-31 SDOH — ECONOMIC STABILITY: INCOME INSECURITY: HOW HARD IS IT FOR YOU TO PAY FOR THE VERY BASICS LIKE FOOD, HOUSING, MEDICAL CARE, AND HEATING?: NOT HARD AT ALL

## 2023-05-31 SDOH — ECONOMIC STABILITY: FOOD INSECURITY: WITHIN THE PAST 12 MONTHS, YOU WORRIED THAT YOUR FOOD WOULD RUN OUT BEFORE YOU GOT MONEY TO BUY MORE.: NEVER TRUE

## 2023-05-31 ASSESSMENT — ENCOUNTER SYMPTOMS
SINUS PAIN: 0
COUGH: 0
DIARRHEA: 0
NAUSEA: 0
EYE PAIN: 0
APNEA: 0
COLOR CHANGE: 0
EYE REDNESS: 0
BACK PAIN: 0
EYE DISCHARGE: 0
ABDOMINAL PAIN: 0
ABDOMINAL DISTENTION: 0
SHORTNESS OF BREATH: 0
EYE ITCHING: 0
CONSTIPATION: 0

## 2023-05-31 ASSESSMENT — PATIENT HEALTH QUESTIONNAIRE - PHQ9
SUM OF ALL RESPONSES TO PHQ QUESTIONS 1-9: 0
1. LITTLE INTEREST OR PLEASURE IN DOING THINGS: 0
SUM OF ALL RESPONSES TO PHQ QUESTIONS 1-9: 0
SUM OF ALL RESPONSES TO PHQ QUESTIONS 1-9: 0
SUM OF ALL RESPONSES TO PHQ9 QUESTIONS 1 & 2: 0
SUM OF ALL RESPONSES TO PHQ QUESTIONS 1-9: 0
2. FEELING DOWN, DEPRESSED OR HOPELESS: 0

## 2023-05-31 NOTE — PROGRESS NOTES
[unfilled]  [address] [place] [id number]      PROGRESS NOTE    SUBJECTIVE:   Simone Aviles is a 37 y.o. male seen for a follow up visit regarding the following chief complaint:     Chief Complaint   Patient presents with    Hemorrhoids       Hemorrhoids  Pertinent negatives include no abdominal pain, arthralgias, chest pain, chills, congestion, coughing, fatigue, fever, headaches, joint swelling, myalgias, nausea, neck pain, rash or weakness. Patient presents for concerns of painful loose stools and ongoing hemorrhoids. He was having some rectal pain, external swelling and slight bleeding with BM; he denies any further bleeding today. Denies any hx of colonoscopy or gastroenterology consult;  HL: stable on current regimen, denies any intolerance to tricor and he is compliant with this. Due for refill today; Anxiety managed with valium and prozac and he would like refill of valium today. HTN: he is compliant with current treatment regimen, denies any intolerance. he denies any headache, dizziness or edema. Current Outpatient Medications   Medication Sig Dispense Refill    fenofibrate (TRICOR) 145 MG tablet Take 1 tablet by mouth daily 30 tablet 5    atenolol (TENORMIN) 100 MG tablet 1/2 tablet by mouth BID 30 tablet 5    diazePAM (VALIUM) 5 MG tablet Take 1 tablet by mouth 3 times daily as needed for Anxiety for up to 90 days. Max Daily Amount: 15 mg 90 tablet 2    hydrocortisone (ANUSOL-HC) 25 MG suppository Place 1 suppository rectally in the morning and 1 suppository in the evening. 12 suppository 1    FLUoxetine (PROZAC) 10 MG capsule Take 1 capsule by mouth daily 90 capsule 1     No current facility-administered medications for this visit.      No Known Allergies    Past Medical History:   Diagnosis Date    Anxiety     Depression     Hypertension     Hypertriglyceridemia      Past Surgical History:   Procedure Laterality Date    [address] Surgery

## 2023-06-04 LAB
GLIADIN PEPTIDE IGA SER-ACNC: 7 UNITS (ref 0–19)
GLIADIN PEPTIDE IGG SER-ACNC: 6 UNITS (ref 0–19)
IGA SERPL-MCNC: 171 MG/DL (ref 90–386)
TTG IGA SER-ACNC: <2 U/ML (ref 0–3)
TTG IGG SER-ACNC: <2 U/ML (ref 0–5)

## 2023-06-15 PROBLEM — K64.9 HEMORRHOID: Status: ACTIVE | Noted: 2023-06-15

## 2023-06-15 PROBLEM — K52.9 CHRONIC DIARRHEA: Status: ACTIVE | Noted: 2023-06-15

## 2023-06-16 ENCOUNTER — PREP FOR PROCEDURE (OUTPATIENT)
Dept: GASTROENTEROLOGY | Age: 43
End: 2023-06-16

## 2023-06-16 PROBLEM — R19.5 LOOSE STOOLS: Status: ACTIVE | Noted: 2023-06-15

## 2023-06-16 PROBLEM — K21.9 GASTROESOPHAGEAL REFLUX DISEASE: Status: ACTIVE | Noted: 2023-06-16

## 2023-06-19 RX ORDER — SODIUM CHLORIDE 0.9 % (FLUSH) 0.9 %
5-40 SYRINGE (ML) INJECTION EVERY 12 HOURS SCHEDULED
Status: CANCELLED | OUTPATIENT
Start: 2023-06-19

## 2023-06-19 RX ORDER — SODIUM CHLORIDE 0.9 % (FLUSH) 0.9 %
5-40 SYRINGE (ML) INJECTION PRN
Status: CANCELLED | OUTPATIENT
Start: 2023-06-19

## 2023-06-19 RX ORDER — SODIUM CHLORIDE 9 MG/ML
25 INJECTION, SOLUTION INTRAVENOUS PRN
Status: CANCELLED | OUTPATIENT
Start: 2023-06-19

## 2023-06-20 NOTE — PROGRESS NOTES
Length of meeting;   60 minutes    Start Time: 5837    Stop Time: 1102    Diagnosis:  Generalized anxiety disorder with panic attacks. Treatment Plan: This is pended to next session as we will plan to review treatment goals at that time; the patient is given an assignment related to goal setting in session today. Patient Prognosis: Guarded at present time. Patient Progress: The patient indicates that he is \"not quite as bad\", noting that he has been referred to a GI provider and has an upcoming procedure. We talked about his top 3 treatment goals which he self selected from a list of 50. They are as follows: Reducing panic attacks, Reducing a fear (health anxiety), and Controlling my alcohol use. Each one of these is discussed at length. In terms of his reducing alcohol use, he would like to control the amount, and if not, to stop it. We will talk about this further but he is encouraged to decrease his daily beer use by no more than one beer a day, and see how this goes; he is also advised that if he chooses to stop drinking entirely, and becomes tremulous, to have someone take him to the nearest ER or to call 911. For next time, I have asked him to write the story of his anxiety as well as what he is missing due to his anxiety, and to be prepared to read it out loud. Mental Status exam: GLO-7 is done with a score of 10; his prior score was a 16. Alcohol use is at 4-6  beers per day. No current active SI or HI and continues to contract for safety. Thought processes appear normal.  Speech is goal directed. The patient does not appear to be responding to internal stimuli. Plan: I will see this patient again in about 2   weeks.     Delsa Dandy, LISW-CP

## 2023-06-21 ENCOUNTER — OFFICE VISIT (OUTPATIENT)
Dept: BEHAVIORAL/MENTAL HEALTH CLINIC | Age: 43
End: 2023-06-21
Payer: MEDICAID

## 2023-06-21 DIAGNOSIS — F41.0 GENERALIZED ANXIETY DISORDER WITH PANIC ATTACKS: Primary | ICD-10-CM

## 2023-06-21 DIAGNOSIS — F41.1 GENERALIZED ANXIETY DISORDER WITH PANIC ATTACKS: Primary | ICD-10-CM

## 2023-06-21 PROCEDURE — 90837 PSYTX W PT 60 MINUTES: CPT | Performed by: SOCIAL WORKER

## 2023-06-21 ASSESSMENT — ANXIETY QUESTIONNAIRES
GAD7 TOTAL SCORE: 10
3. WORRYING TOO MUCH ABOUT DIFFERENT THINGS: 2
4. TROUBLE RELAXING: 1
6. BECOMING EASILY ANNOYED OR IRRITABLE: 1
7. FEELING AFRAID AS IF SOMETHING AWFUL MIGHT HAPPEN: 2
5. BEING SO RESTLESS THAT IT IS HARD TO SIT STILL: 1
2. NOT BEING ABLE TO STOP OR CONTROL WORRYING: 1
IF YOU CHECKED OFF ANY PROBLEMS ON THIS QUESTIONNAIRE, HOW DIFFICULT HAVE THESE PROBLEMS MADE IT FOR YOU TO DO YOUR WORK, TAKE CARE OF THINGS AT HOME, OR GET ALONG WITH OTHER PEOPLE: SOMEWHAT DIFFICULT
1. FEELING NERVOUS, ANXIOUS, OR ON EDGE: 2

## 2023-06-29 RX ORDER — POLYETHYLENE GLYCOL 3350 17 G/17G
POWDER, FOR SOLUTION ORAL
COMMUNITY
Start: 2023-06-16

## 2023-07-03 NOTE — PROGRESS NOTES
Length of meeting; 61   minutes    Start Time: 4761    Stop Time: 3730    Diagnosis:  Generalized anxiety disorder with panic attacks. Alcohol use disorder, moderate, dependence. Treatment Plan: Encourage AA attendance for alcohol use. Exposure and response activation tx for anxiety. AWARE process for panic attack. Patient Prognosis: Fair    Patient Progress: The patient indicates that he is \"doing OK\". We talked about his intent to decrease the amount of his alcohol use and he indicates that he has not been able to cut back consistently. He notes that he was more nervous. He plans to try AA again. He believes that he has a problem with alcohol. He is also given the number for [facility] to call if needed. I reiterated that if he begins to decrease the amount of his alcohol and gets tremulous, he should have someone take him to the nearest ER, or to call 911, which he agrees to do. We also reviewed from the prior assignment which was for him to write the story of his anxiety as well as what he is missing due to his anxiety, and to be prepared to read it out loud. He was able to do this, and then we also reviewed his assignment to identify what he is missing because of his anxiety. Notably, he indicates that this started out with a fear of something bad happening when he was about age 11, and he indicated that he developed a complaint of stomach pain so that he could get away from situations where he was afraid. We talked about how this same scenario plays itself out in his present life. For his next assignment relating to anxiety, I have asked him to note his automatic thoughts related to same, and then to ask himself if this is a helpful thought and if not, to note what a more helpful thought would be. He is given a spreadsheet to assist him in the documentation process. Mental Status exam: GLO-7 is done with a score of 14; his prior score was a 10.   He has had one    panic

## 2023-07-05 ENCOUNTER — OFFICE VISIT (OUTPATIENT)
Dept: BEHAVIORAL/MENTAL HEALTH CLINIC | Age: 43
End: 2023-07-05
Payer: MEDICAID

## 2023-07-05 DIAGNOSIS — F41.0 GENERALIZED ANXIETY DISORDER WITH PANIC ATTACKS: Primary | ICD-10-CM

## 2023-07-05 DIAGNOSIS — F41.1 GENERALIZED ANXIETY DISORDER WITH PANIC ATTACKS: Primary | ICD-10-CM

## 2023-07-05 DIAGNOSIS — F10.20 ALCOHOL USE DISORDER, MODERATE, DEPENDENCE (HCC): ICD-10-CM

## 2023-07-05 PROCEDURE — 90837 PSYTX W PT 60 MINUTES: CPT | Performed by: SOCIAL WORKER

## 2023-07-05 ASSESSMENT — ANXIETY QUESTIONNAIRES
6. BECOMING EASILY ANNOYED OR IRRITABLE: 2
GAD7 TOTAL SCORE: 14
4. TROUBLE RELAXING: 2
7. FEELING AFRAID AS IF SOMETHING AWFUL MIGHT HAPPEN: 3
IF YOU CHECKED OFF ANY PROBLEMS ON THIS QUESTIONNAIRE, HOW DIFFICULT HAVE THESE PROBLEMS MADE IT FOR YOU TO DO YOUR WORK, TAKE CARE OF THINGS AT HOME, OR GET ALONG WITH OTHER PEOPLE: SOMEWHAT DIFFICULT
1. FEELING NERVOUS, ANXIOUS, OR ON EDGE: 3
3. WORRYING TOO MUCH ABOUT DIFFERENT THINGS: 2
5. BEING SO RESTLESS THAT IT IS HARD TO SIT STILL: 1
2. NOT BEING ABLE TO STOP OR CONTROL WORRYING: 1

## 2023-07-16 ENCOUNTER — ANESTHESIA EVENT (OUTPATIENT)
Dept: ENDOSCOPY | Age: 43
End: 2023-07-16
Payer: MEDICAID

## 2023-07-16 RX ORDER — DEXTROSE MONOHYDRATE 100 MG/ML
INJECTION, SOLUTION INTRAVENOUS CONTINUOUS PRN
Status: CANCELLED | OUTPATIENT
Start: 2023-07-16

## 2023-07-16 RX ORDER — SODIUM CHLORIDE, SODIUM LACTATE, POTASSIUM CHLORIDE, CALCIUM CHLORIDE 600; 310; 30; 20 MG/100ML; MG/100ML; MG/100ML; MG/100ML
INJECTION, SOLUTION INTRAVENOUS CONTINUOUS
Status: CANCELLED | OUTPATIENT
Start: 2023-07-16

## 2023-07-16 RX ORDER — SODIUM CHLORIDE 0.9 % (FLUSH) 0.9 %
5-40 SYRINGE (ML) INJECTION PRN
Status: CANCELLED | OUTPATIENT
Start: 2023-07-16

## 2023-07-16 RX ORDER — SODIUM CHLORIDE 9 MG/ML
INJECTION, SOLUTION INTRAVENOUS PRN
Status: CANCELLED | OUTPATIENT
Start: 2023-07-16

## 2023-07-16 RX ORDER — SODIUM CHLORIDE 0.9 % (FLUSH) 0.9 %
5-40 SYRINGE (ML) INJECTION EVERY 12 HOURS SCHEDULED
Status: CANCELLED | OUTPATIENT
Start: 2023-07-16

## 2023-07-17 ENCOUNTER — ANESTHESIA (OUTPATIENT)
Dept: ENDOSCOPY | Age: 43
End: 2023-07-17
Payer: MEDICAID

## 2023-07-17 ENCOUNTER — HOSPITAL ENCOUNTER (OUTPATIENT)
Age: 43
Setting detail: OUTPATIENT SURGERY
Discharge: HOME | End: 2023-07-17
Attending: INTERNAL MEDICINE | Admitting: INTERNAL MEDICINE
Payer: MEDICAID

## 2023-07-17 VITALS
OXYGEN SATURATION: 96 % | BODY MASS INDEX: 30.06 KG/M2 | HEIGHT: 70 IN | WEIGHT: 210 LBS | SYSTOLIC BLOOD PRESSURE: 127 MMHG | DIASTOLIC BLOOD PRESSURE: 83 MMHG | HEART RATE: 56 BPM | TEMPERATURE: 98.6 F | RESPIRATION RATE: 20 BRPM

## 2023-07-17 PROCEDURE — 2580000003 HC RX 258: Performed by: ANESTHESIOLOGY

## 2023-07-17 PROCEDURE — 88305 TISSUE EXAM BY PATHOLOGIST: CPT

## 2023-07-17 PROCEDURE — 2500000003 HC RX 250 WO HCPCS: Performed by: NURSE ANESTHETIST, CERTIFIED REGISTERED

## 2023-07-17 PROCEDURE — 3609009300 HC COLONOSCOPY BIOPSY/STOMA: Performed by: INTERNAL MEDICINE

## 2023-07-17 PROCEDURE — 45380 COLONOSCOPY AND BIOPSY: CPT | Performed by: INTERNAL MEDICINE

## 2023-07-17 PROCEDURE — 6360000002 HC RX W HCPCS: Performed by: NURSE ANESTHETIST, CERTIFIED REGISTERED

## 2023-07-17 PROCEDURE — 7100000010 HC PHASE II RECOVERY - FIRST 15 MIN: Performed by: INTERNAL MEDICINE

## 2023-07-17 PROCEDURE — 3700000000 HC ANESTHESIA ATTENDED CARE: Performed by: INTERNAL MEDICINE

## 2023-07-17 PROCEDURE — 7100000011 HC PHASE II RECOVERY - ADDTL 15 MIN: Performed by: INTERNAL MEDICINE

## 2023-07-17 PROCEDURE — 2709999900 HC NON-CHARGEABLE SUPPLY: Performed by: INTERNAL MEDICINE

## 2023-07-17 RX ORDER — SODIUM CHLORIDE 9 MG/ML
25 INJECTION, SOLUTION INTRAVENOUS PRN
Status: DISCONTINUED | OUTPATIENT
Start: 2023-07-17 | End: 2023-07-17 | Stop reason: HOSPADM

## 2023-07-17 RX ORDER — SODIUM CHLORIDE 0.9 % (FLUSH) 0.9 %
5-40 SYRINGE (ML) INJECTION EVERY 12 HOURS SCHEDULED
Status: DISCONTINUED | OUTPATIENT
Start: 2023-07-17 | End: 2023-07-17 | Stop reason: HOSPADM

## 2023-07-17 RX ORDER — LIDOCAINE HYDROCHLORIDE 10 MG/ML
1 INJECTION, SOLUTION INFILTRATION; PERINEURAL
Status: DISCONTINUED | OUTPATIENT
Start: 2023-07-17 | End: 2023-07-17 | Stop reason: HOSPADM

## 2023-07-17 RX ORDER — PROPOFOL 10 MG/ML
INJECTION, EMULSION INTRAVENOUS CONTINUOUS PRN
Status: DISCONTINUED | OUTPATIENT
Start: 2023-07-17 | End: 2023-07-17 | Stop reason: SDUPTHER

## 2023-07-17 RX ORDER — PROPOFOL 10 MG/ML
INJECTION, EMULSION INTRAVENOUS PRN
Status: DISCONTINUED | OUTPATIENT
Start: 2023-07-17 | End: 2023-07-17 | Stop reason: SDUPTHER

## 2023-07-17 RX ORDER — SODIUM CHLORIDE 0.9 % (FLUSH) 0.9 %
5-40 SYRINGE (ML) INJECTION PRN
Status: DISCONTINUED | OUTPATIENT
Start: 2023-07-17 | End: 2023-07-17 | Stop reason: HOSPADM

## 2023-07-17 RX ORDER — LIDOCAINE HYDROCHLORIDE 20 MG/ML
INJECTION, SOLUTION EPIDURAL; INFILTRATION; INTRACAUDAL; PERINEURAL PRN
Status: DISCONTINUED | OUTPATIENT
Start: 2023-07-17 | End: 2023-07-17 | Stop reason: SDUPTHER

## 2023-07-17 RX ORDER — SODIUM CHLORIDE, SODIUM LACTATE, POTASSIUM CHLORIDE, CALCIUM CHLORIDE 600; 310; 30; 20 MG/100ML; MG/100ML; MG/100ML; MG/100ML
INJECTION, SOLUTION INTRAVENOUS CONTINUOUS
Status: DISCONTINUED | OUTPATIENT
Start: 2023-07-17 | End: 2023-07-17 | Stop reason: HOSPADM

## 2023-07-17 RX ORDER — SODIUM CHLORIDE 9 MG/ML
INJECTION, SOLUTION INTRAVENOUS PRN
Status: DISCONTINUED | OUTPATIENT
Start: 2023-07-17 | End: 2023-07-17 | Stop reason: HOSPADM

## 2023-07-17 RX ORDER — FLUOXETINE 10 MG/1
10 CAPSULE ORAL DAILY
COMMUNITY

## 2023-07-17 RX ADMIN — PROPOFOL 100 MG: 10 INJECTION, EMULSION INTRAVENOUS at 08:20

## 2023-07-17 RX ADMIN — LIDOCAINE HYDROCHLORIDE 50 MG: 20 INJECTION, SOLUTION EPIDURAL; INFILTRATION; INTRACAUDAL; PERINEURAL at 08:20

## 2023-07-17 RX ADMIN — PROPOFOL 50 MG: 10 INJECTION, EMULSION INTRAVENOUS at 08:22

## 2023-07-17 RX ADMIN — PROPOFOL 30 MG: 10 INJECTION, EMULSION INTRAVENOUS at 08:23

## 2023-07-17 RX ADMIN — PROPOFOL 50 MG: 10 INJECTION, EMULSION INTRAVENOUS at 08:21

## 2023-07-17 RX ADMIN — SODIUM CHLORIDE, POTASSIUM CHLORIDE, SODIUM LACTATE AND CALCIUM CHLORIDE: 600; 310; 30; 20 INJECTION, SOLUTION INTRAVENOUS at 07:28

## 2023-07-17 RX ADMIN — PROPOFOL 200 MCG/KG/MIN: 10 INJECTION, EMULSION INTRAVENOUS at 08:24

## 2023-07-17 ASSESSMENT — ENCOUNTER SYMPTOMS
ANAL BLEEDING: 1
DIARRHEA: 1

## 2023-07-17 ASSESSMENT — PAIN SCALES - GENERAL
PAINLEVEL_OUTOF10: 0

## 2023-07-17 ASSESSMENT — PAIN - FUNCTIONAL ASSESSMENT: PAIN_FUNCTIONAL_ASSESSMENT: NONE - DENIES PAIN

## 2023-07-17 NOTE — H&P
Ger Hayes (:  1980) is a 37 y.o. male, new patient here for evaluation of the following chief complaint(s): bleeding. Diarrhea  No chief complaint on file. ASSESSMENT/PLAN: Rectal bleeding. Diarrhea/colonoscopy. [unfilled]         Subjective   SUBJECTIVE/OBJECTIVE  Patient presents with recurrent episode of loose watery bowel movement associated lower quadrant pain and blood in the stool. Today throughout the possibility of colitis    Past Medical History:   Diagnosis Date    Anxiety     Depression     GERD (gastroesophageal reflux disease)     Hypertension     Hypertriglyceridemia     Palpitations     on atenolol for PVC's per pt       Past Surgical History:   Procedure Laterality Date    [facility] Surgery              No Known Allergies       Review of Systems   Gastrointestinal:  Positive for anal bleeding and diarrhea. Objective   Physical Exam  HENT:      Head: Normocephalic. Mouth/Throat:      Mouth: Mucous membranes are moist.   Eyes:      General: No scleral icterus. Cardiovascular:      Rate and Rhythm: Normal rate and regular rhythm. Pulmonary:      Effort: No respiratory distress. Abdominal:      General: There is no distension. Tenderness: There is no abdominal tenderness. There is no rebound. Lymphadenopathy:      Cervical: No cervical adenopathy. Skin:     Coloration: Skin is not jaundiced. Findings: No bruising. Neurological:      General: No focal deficit present. Mental Status: He is alert.             Current Facility-Administered Medications   Medication Dose Route Frequency Provider Last Rate Last Admin    lidocaine 1 % injection 1 mL  1 mL IntraDERmal Once PRN Stuart Nathan MD        lactated ringers IV soln infusion   IntraVENous Continuous Stuart Nathan  mL/hr at 23 0757 NoRateChange at 23 0757    sodium chloride flush 0.9 % injection 5-40 mL  5-40 mL IntraVENous 2 times

## 2023-07-17 NOTE — PROGRESS NOTES
0858-Discharge instructions were reviewed with patient and pts friend, Smita Harden. An opportunity was given for questions. Patient and Smita Harden verbalized understanding, and has no questions at this time. 0912-To lobby via wheelchair accompanied by staff. Discharged to home in good condition via private vehicle.

## 2023-07-17 NOTE — ANESTHESIA POSTPROCEDURE EVALUATION
Department of Anesthesiology  Postprocedure Note    Patient: Lexine Cooks  MRN: [id number]  YOB: 1980  Date of evaluation: 7/17/2023      Procedure Summary     Date: 07/17/23 Room / Location: [facility] ENDO 04 / [facility] ENDOSCOPY    Anesthesia Start: 9386 Anesthesia Stop: 4120    Procedure: COLONOSCOPY, BIOPSY (Lower GI Region) Diagnosis:       Loose stools      Hemorrhoids, unspecified hemorrhoid type      Gastroesophageal reflux disease      (Loose stools [R19.5])      (Hemorrhoids, unspecified hemorrhoid type [K64.9])      (Gastroesophageal reflux disease [K21.9])    Surgeons: Isaiah Ragland MD Responsible Provider: Génesis Borges MD    Anesthesia Type: TIVA ASA Status: 2          Anesthesia Type: No value filed. Marek Phase I: Marek Score: 10    Marek Phase II: Marek Score: 10      Anesthesia Post Evaluation    Patient location during evaluation: PACU  Patient participation: complete - patient participated  Level of consciousness: awake and alert  Airway patency: patent  Nausea: well controlled. Complications: no  Cardiovascular status: acceptable.   Respiratory status: acceptable  Hydration status: stable

## 2023-07-17 NOTE — PROCEDURES
Operative Report    Patient: Gail Vargas MRN: [id number]      YOB: 1980  Age: 37 y.o. Sex: male            Indications:  rectal bleeding, change in bowel [email or web address]     Preoperative Evaluation: The patient was evaluated prior to the procedure in the GI lab admission area, the patient ASA was recorded . Consent was obtained from the patient with the risk of perforation bleeding and aspiration. Anesthesia: AMARIS-per anesthesia    Complications: None; patient tolerated the procedure well. EBL -insignificant      Procedure: The patient was sedated in the left lateral decubitus position. Scope was advanced from the rectum to the ileum. Evaluation was performed to the cecum twice. The scope was withdrawn to the rectum, retroflexed view was performed. The rectal exam was normal.  Preparation was good Vermillion score of 3/3/3:9 . Findings:   Exam to the ileum normal ileal mucosa that was biopsied normal colon Koza with normal vascular pattern. Random right and left-sided colon biopsies were taken. No sign of colitis ileitis or polyps. Circumferential internal/external hemorrhoid at the dentate line noted      Postoperative Diagnosis: Possible irritable bowel syndrome. Internal/external hemorrhoidal bleed. Follow-up pathology    07108 Colonoscopy, Flexible; with biopsy, single or multiple      Recommendations:   - Await pathology.       Signed By:  Eulalio Del Cid MD     July 17, 2023

## 2023-07-19 ENCOUNTER — OFFICE VISIT (OUTPATIENT)
Dept: BEHAVIORAL/MENTAL HEALTH CLINIC | Age: 43
End: 2023-07-19
Payer: MEDICAID

## 2023-07-19 DIAGNOSIS — F41.0 GENERALIZED ANXIETY DISORDER WITH PANIC ATTACKS: Primary | ICD-10-CM

## 2023-07-19 DIAGNOSIS — F41.1 GENERALIZED ANXIETY DISORDER WITH PANIC ATTACKS: Primary | ICD-10-CM

## 2023-07-19 DIAGNOSIS — F10.20 ALCOHOL USE DISORDER, MODERATE, DEPENDENCE (HCC): ICD-10-CM

## 2023-07-19 PROCEDURE — 90837 PSYTX W PT 60 MINUTES: CPT | Performed by: SOCIAL WORKER

## 2023-07-19 ASSESSMENT — ANXIETY QUESTIONNAIRES
GAD7 TOTAL SCORE: 13
6. BECOMING EASILY ANNOYED OR IRRITABLE: 1
3. WORRYING TOO MUCH ABOUT DIFFERENT THINGS: 3
5. BEING SO RESTLESS THAT IT IS HARD TO SIT STILL: 1
2. NOT BEING ABLE TO STOP OR CONTROL WORRYING: 2
IF YOU CHECKED OFF ANY PROBLEMS ON THIS QUESTIONNAIRE, HOW DIFFICULT HAVE THESE PROBLEMS MADE IT FOR YOU TO DO YOUR WORK, TAKE CARE OF THINGS AT HOME, OR GET ALONG WITH OTHER PEOPLE: SOMEWHAT DIFFICULT
7. FEELING AFRAID AS IF SOMETHING AWFUL MIGHT HAPPEN: 3
1. FEELING NERVOUS, ANXIOUS, OR ON EDGE: 2
4. TROUBLE RELAXING: 1

## 2023-07-19 NOTE — PROGRESS NOTES
Length of meeting; 54  minutes    Start Time:1455    Stop Time: 0297    Diagnosis:  Generalized anxiety disorder with panic attacks. Alcohol use disorder, moderate, dependence. Treatment Plan: Encourage AA attendance for alcohol use. Exposure and response activation tx for anxiety. AWARE process for panic attack. Patient Prognosis: Fair    Patient Progress: The patient indicates that he is  OK. Followed up with him regarding his alcohol use and his plans to try AA again. He indicates that he has reduced it a bit, but some of this was due to a medical procedure. He is unsure about attending AA meetings at this time. We also reviewed from the prior assignment which was for him to note his automatic thoughts related to same, and then to ask himself if this is a helpful thought and if not, to note what a more helpful thought would be. He did not do this assignment and this is discussed. He notes that he thinks he can complete this next time. We will review this again. Presented AWARE process for panic attacks. Given printed material for same. Enc him to practice the process one-two times per day. Mental Status exam: GLO-7 is done with a score of 13; his prior score was a 14. No current active SI or HI and continues to contract for safety. Thought processes appear normal.  Speech is goal directed. The patient does not appear to be responding to internal stimuli. Plan: I will see this patient again in about 2 weeks.     SENIA Bae

## 2023-07-25 ENCOUNTER — TELEPHONE (OUTPATIENT)
Dept: GASTROENTEROLOGY | Age: 43
End: 2023-07-25

## 2023-07-25 NOTE — TELEPHONE ENCOUNTER
Called patient with colonoscopy results:    DIAGNOSIS        \"RANDOM COLON BIOPSIES\":               FRAGMENTS OF BENIGN COLONIC MUCOSA WITH OCCASIONAL LYMPHOID                  AGGREGATE. Findings:   Exam to the ileum normal ileal mucosa that was biopsied normal colon Koza with normal vascular pattern. Random right and left-sided colon biopsies were taken. No sign of colitis ileitis or polyps. Circumferential internal/external hemorrhoid at the dentate line noted        Postoperative Diagnosis: Possible irritable bowel syndrome. Internal/external hemorrhoidal bleed. Follow-up pathology      Recommendations:   - Await pathology. No answer. LVM requesting a return call to discuss any ongoing concerns/symptoms.

## 2023-07-28 NOTE — PROGRESS NOTES
Length of meeting;  62 minutes    Start Time: 1005    Stop Time:  1107    Diagnosis:  Generalized anxiety disorder with panic attacks. Alcohol use disorder, moderate, dependence. Treatment Plan: Encourage AA attendance for alcohol use. Exposure and response activation tx for anxiety. AWARE process for panic attack. Patient Prognosis: Fair    Patient Progress: The patient indicates that he is doing OK. We also reviewed from the prior assignment which was for him to note his automatic thoughts related to same, and then to ask himself if this is a helpful thought and if not, to note what a more helpful thought would be. He was able to do this and as we talked, he allowed that he is concerned if this therapy will work, which we talked about. Most of his are health based thoughts and are catastrophic in nature. Theses are discussed and are noted to be future based. Reviewed his helpful thoughts, and we talked about linking actual actions along with his thoughts. I also followed up with him about his use of the AWARE process for panic attacks, related to practicing the process. He indicates that he has done some of this and has found it helpful. He notes that his alcohol use \"is not as big of a problem as it has been. \"     Mental Status exam: GLO-7 is done with a score of 10; his prior score was a 13. No current active SI or HI and continues to contract for safety. Thought processes appear normal.  Speech is goal directed. The patient does not appear to be responding to internal stimuli. Plan: I will see this patient again in about 3 weeks.     SENIA Kohler

## 2023-07-31 ENCOUNTER — OFFICE VISIT (OUTPATIENT)
Dept: BEHAVIORAL/MENTAL HEALTH CLINIC | Age: 43
End: 2023-07-31
Payer: MEDICAID

## 2023-07-31 DIAGNOSIS — F41.0 GENERALIZED ANXIETY DISORDER WITH PANIC ATTACKS: Primary | ICD-10-CM

## 2023-07-31 DIAGNOSIS — F41.1 GENERALIZED ANXIETY DISORDER WITH PANIC ATTACKS: Primary | ICD-10-CM

## 2023-07-31 DIAGNOSIS — F10.20 ALCOHOL USE DISORDER, MODERATE, DEPENDENCE (HCC): ICD-10-CM

## 2023-07-31 PROCEDURE — 90837 PSYTX W PT 60 MINUTES: CPT | Performed by: SOCIAL WORKER

## 2023-07-31 ASSESSMENT — ANXIETY QUESTIONNAIRES
4. TROUBLE RELAXING: 1
IF YOU CHECKED OFF ANY PROBLEMS ON THIS QUESTIONNAIRE, HOW DIFFICULT HAVE THESE PROBLEMS MADE IT FOR YOU TO DO YOUR WORK, TAKE CARE OF THINGS AT HOME, OR GET ALONG WITH OTHER PEOPLE: SOMEWHAT DIFFICULT
5. BEING SO RESTLESS THAT IT IS HARD TO SIT STILL: 1
1. FEELING NERVOUS, ANXIOUS, OR ON EDGE: 2
2. NOT BEING ABLE TO STOP OR CONTROL WORRYING: 1
6. BECOMING EASILY ANNOYED OR IRRITABLE: 1
7. FEELING AFRAID AS IF SOMETHING AWFUL MIGHT HAPPEN: 2
3. WORRYING TOO MUCH ABOUT DIFFERENT THINGS: 2
GAD7 TOTAL SCORE: 10

## 2023-08-21 NOTE — PROGRESS NOTES
Length of meeting; 58  minutes    Start Time: 1005    Stop Time: 1103    Diagnosis:  Generalized anxiety disorder with panic attacks. Alcohol use disorder, moderate, dependence. Treatment Plan: Encourage AA attendance for alcohol use. Exposure and response activation tx for anxiety. AWARE process for panic attack. Patient Prognosis: Fair    Patient Progress: The patient indicates that he is \"better\" and notes he has had one panic attack and used the AWARE process. Queried patient's alcohol use which is indicated to be about 4 drinks per day, though he was doing 4-6 drinks initially. He notes that this is not daily, and while he drinks daily, it may  not be as much each day. He has not attended [address] [place] [address] [employer] meetings but is beginning to read the [employer] of [address] [place] [address] [employer]. Reviewed prior assignment, which was to: What are your activities (what you are doing) when your LINH level is low, medium, or high? (Given spreadsheet) noting:  Date / Situation / Anxiety level (1-10)/ Thoughts/ What changed thought helped? 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 For next assignment, I have asked him to:   Given spreadsheet to note what he is expecting to happen with an event that promotes anxiety, rate level of anxiety, then note what actually happened when the event occurred. Mental Status exam: GLO-7 is done with a score of 9; his prior score was a 10. No current active SI or HI and continues to contract for safety. Thought processes appear normal.  Speech is goal directed. The patient does not appear to be responding to internal stimuli. Plan: I will see this patient again in about 3 weeks.     SENIA Maddox

## 2023-08-22 ENCOUNTER — OFFICE VISIT (OUTPATIENT)
Dept: BEHAVIORAL/MENTAL HEALTH CLINIC | Age: 43
End: 2023-08-22
Payer: MEDICAID

## 2023-08-22 DIAGNOSIS — F41.0 GENERALIZED ANXIETY DISORDER WITH PANIC ATTACKS: Primary | ICD-10-CM

## 2023-08-22 DIAGNOSIS — F10.20 ALCOHOL USE DISORDER, MODERATE, DEPENDENCE (HCC): ICD-10-CM

## 2023-08-22 DIAGNOSIS — F41.1 GENERALIZED ANXIETY DISORDER WITH PANIC ATTACKS: Primary | ICD-10-CM

## 2023-08-22 PROCEDURE — 90837 PSYTX W PT 60 MINUTES: CPT | Performed by: SOCIAL WORKER

## 2023-08-22 ASSESSMENT — ANXIETY QUESTIONNAIRES
2. NOT BEING ABLE TO STOP OR CONTROL WORRYING: 1
7. FEELING AFRAID AS IF SOMETHING AWFUL MIGHT HAPPEN: 2
5. BEING SO RESTLESS THAT IT IS HARD TO SIT STILL: 1
6. BECOMING EASILY ANNOYED OR IRRITABLE: 1
IF YOU CHECKED OFF ANY PROBLEMS ON THIS QUESTIONNAIRE, HOW DIFFICULT HAVE THESE PROBLEMS MADE IT FOR YOU TO DO YOUR WORK, TAKE CARE OF THINGS AT HOME, OR GET ALONG WITH OTHER PEOPLE: SOMEWHAT DIFFICULT
4. TROUBLE RELAXING: 1
1. FEELING NERVOUS, ANXIOUS, OR ON EDGE: 2
3. WORRYING TOO MUCH ABOUT DIFFERENT THINGS: 1
GAD7 TOTAL SCORE: 9

## 2023-08-25 DIAGNOSIS — F41.1 GENERALIZED ANXIETY DISORDER: ICD-10-CM

## 2023-08-25 RX ORDER — DIAZEPAM 5 MG/1
5 TABLET ORAL 3 TIMES DAILY PRN
Qty: 90 TABLET | Refills: 2 | Status: SHIPPED | OUTPATIENT
Start: 2023-08-25 | End: 2023-11-23

## 2023-08-25 NOTE — TELEPHONE ENCOUNTER
----- Message from [facility] sent at 8/24/2023  4:12 PM EDT -----  Subject: Refill Request    QUESTIONS  Name of Medication? diazePAM (VALIUM) 5 MG tablet  Patient-reported dosage and instructions? 5 mg as needed up to 3 times   daily  How many days do you have left? 3  [id number]  Pharmacy phone number (if available)? [phone]  ---------------------------------------------------------------------------  --------------  Nahomi Brown INFO  What is the best way for the office to contact you? OK to leave message on   voicemail  Preferred Call Back Phone Number? [phone]  ---------------------------------------------------------------------------  --------------  SCRIPT ANSWERS  Relationship to Patient?  Self

## 2023-08-29 ENCOUNTER — OFFICE VISIT (OUTPATIENT)
Dept: BEHAVIORAL/MENTAL HEALTH CLINIC | Age: 43
End: 2023-08-29
Payer: MEDICAID

## 2023-08-29 VITALS
DIASTOLIC BLOOD PRESSURE: 88 MMHG | HEIGHT: 70 IN | WEIGHT: 210 LBS | OXYGEN SATURATION: 98 % | HEART RATE: 65 BPM | TEMPERATURE: 98.4 F | SYSTOLIC BLOOD PRESSURE: 132 MMHG | BODY MASS INDEX: 30.06 KG/M2

## 2023-08-29 DIAGNOSIS — F41.0 PANIC DISORDER: Primary | ICD-10-CM

## 2023-08-29 PROCEDURE — 99213 OFFICE O/P EST LOW 20 MIN: CPT | Performed by: STUDENT IN AN ORGANIZED HEALTH CARE EDUCATION/TRAINING PROGRAM

## 2023-08-29 PROCEDURE — 3075F SYST BP GE 130 - 139MM HG: CPT | Performed by: STUDENT IN AN ORGANIZED HEALTH CARE EDUCATION/TRAINING PROGRAM

## 2023-08-29 PROCEDURE — 3079F DIAST BP 80-89 MM HG: CPT | Performed by: STUDENT IN AN ORGANIZED HEALTH CARE EDUCATION/TRAINING PROGRAM

## 2023-08-29 RX ORDER — FLUOXETINE 10 MG/1
10 CAPSULE ORAL EVERY OTHER DAY
Qty: 45 CAPSULE | Refills: 1 | Status: SHIPPED | OUTPATIENT
Start: 2023-08-29

## 2023-08-29 ASSESSMENT — PATIENT HEALTH QUESTIONNAIRE - PHQ9
1. LITTLE INTEREST OR PLEASURE IN DOING THINGS: 0
SUM OF ALL RESPONSES TO PHQ9 QUESTIONS 1 & 2: 0
SUM OF ALL RESPONSES TO PHQ QUESTIONS 1-9: 0
2. FEELING DOWN, DEPRESSED OR HOPELESS: 0
SUM OF ALL RESPONSES TO PHQ QUESTIONS 1-9: 0

## 2023-08-29 NOTE — PROGRESS NOTES
smile appropriately    Thought process: linear, goal directed, and coherent    Thought content: denies SI/HI, A/VH, paranoia or delusions    Orientation: oriented to person, place, and time    Memory: Intact long-term and Intact short-term    Attention/Concentration: Sustained    Fund of knowledge: fair    Judgement: fair    Insight: fair         Questionnaire Findings:    PHQ2: 0 (8/29/23)    GAD7: 9 (8/22/23)         Assessment/Summary of Findings:    Isaura Sparks is a 37 y.o. male with reported prior psychiatric history significant for anxiety who presents for medication management. Pt reports that they are currently prescribed: Prozac 10 mg daily, Valium 5 mg BID PRN (typically takes twice daily). Pt reports that he has been doing well overall and [facility], which has been beneficial for his anxiety. Continues to report positive response to Prozac, but states that he will experience a dissociative-like feeling after being on it for 4-5 days in a row, so he has started taking a couple days off before restarting. Denies other significant SE and denies SI/HI, A/VH, paranoia or delusions. Discussed reducing frequency of dose to every other days to see if he continues to receive benefit without reported SE. Will f/u in 3 mo. Diagnosis:   Panic disorder  Alcohol use disorder, moderate       Plan:    Geneva Stearns will receive medication management, therapy at [address] [place] [address]. Medication Recommendations:    - Decrease Prozac to 10 mg every other day for anxiety    - Continue Valium 5 mg BID PRN, but discussed minimizing use, especially around EtOH in effort to reduce overall risk    - Medication side effect profiles, risks, and benefits were discussed with the patient. - Patient encouraged to contact the clinic if experiencing any adverse reactions with medications.       Other Recommendations:    [facility]    - If

## 2023-09-11 NOTE — PROGRESS NOTES
Length of meeting; 49 minutes    Start Time: 1104    Stop Time: 0920    Diagnosis:  Generalized anxiety disorder with panic attacks. Alcohol use disorder, moderate, dependence. Treatment Plan: Encourage AA attendance for alcohol use. Exposure and response activation tx for anxiety. AWARE process for panic attack. Patient Prognosis: Fair    Patient Progress: The patient indicates that he is [place] a difficult time; not terrible, but difficult \" and notes he has had one panic attack. .     Reviewed prior assignment, which was to:    :   Note what he is expecting to happen with an event that promotes anxiety, rate level of anxiety, then note what actually happened when the event occurred. We discussed his expectations over high anxiety events and what actually happened, and talked about how that may alter his expectations in these type of events. Notable use of \"what if's\" and we talked about how these form an anticipated event in his head. Notably, he had an instance where he had a panic attack and where he would typically go and get a beer, he waited it out using AWARE process and notes that it subsided in 15 minutes, versus about an hour when he drinks a beer. For the next session, I have asked him to repeat the assignment above. Mental Status exam: GLO-7 is done with a score of 11; his prior score was a 9. No current active SI or HI and continues to contract for safety. Thought processes appear normal.  Speech is goal directed. The patient does not appear to be responding to internal stimuli. Plan: I will see this patient again in about 3 weeks.     SENIA Kilgore

## 2023-09-12 ENCOUNTER — OFFICE VISIT (OUTPATIENT)
Dept: BEHAVIORAL/MENTAL HEALTH CLINIC | Age: 43
End: 2023-09-12
Payer: MEDICAID

## 2023-09-12 DIAGNOSIS — F41.0 GENERALIZED ANXIETY DISORDER WITH PANIC ATTACKS: Primary | ICD-10-CM

## 2023-09-12 DIAGNOSIS — F10.20 ALCOHOL USE DISORDER, MODERATE, DEPENDENCE (HCC): ICD-10-CM

## 2023-09-12 DIAGNOSIS — F41.1 GENERALIZED ANXIETY DISORDER WITH PANIC ATTACKS: Primary | ICD-10-CM

## 2023-09-12 PROCEDURE — 90834 PSYTX W PT 45 MINUTES: CPT | Performed by: SOCIAL WORKER

## 2023-09-12 ASSESSMENT — ANXIETY QUESTIONNAIRES
7. FEELING AFRAID AS IF SOMETHING AWFUL MIGHT HAPPEN: 3
3. WORRYING TOO MUCH ABOUT DIFFERENT THINGS: 2
1. FEELING NERVOUS, ANXIOUS, OR ON EDGE: 2
4. TROUBLE RELAXING: 1
2. NOT BEING ABLE TO STOP OR CONTROL WORRYING: 1
6. BECOMING EASILY ANNOYED OR IRRITABLE: 1
GAD7 TOTAL SCORE: 11
5. BEING SO RESTLESS THAT IT IS HARD TO SIT STILL: 1

## 2023-10-02 NOTE — PROGRESS NOTES
Length of meeting; 43  minutes    Start Time: 7553    Stop Time: 5937    Diagnosis:  Generalized anxiety disorder with panic attacks. Alcohol use disorder, moderate, dependence. Treatment Plan: Encourage AA attendance for alcohol use. Exposure and response activation tx for anxiety. AWARE process for panic attack. Patient Prognosis: Fair    Patient Progress: The patient indicates that he is \"OK\"  . Overall notes an increase in his skills in dealing with his anxiety. 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 Notes better ability to deal with panic attacks. Engaging in less and less safety behaviors. Notes a recent encounter with a dentist where he had a tooth extracted, and had no panic attack during the process. Notes that his alcohol use is much less during the day now, noting \"I don't do much of that anymore. \"      Reviewed prior assignment, which was to:    :   Note what he is expecting to happen with an event that promotes anxiety, rate level of anxiety, then note what actually happened when the event occurred. This was discussed and several instances where he noted marked differences between his expectations versus what actually occurred. For next assignment, talked about continuing to work on \"bossing back\" his anxiety, which we discussed and to also continue to come up with more helpful thoughts when     Mental Status exam: GLO-7 is done with a score of 8; his prior score was a 11. No current active SI or HI and continues to contract for safety. Thought processes appear normal.  Speech is goal directed. The patient does not appear to be responding to internal stimuli. Plan: I will see this patient again in about 3  weeks.     SENIA Stewart

## 2023-10-03 ENCOUNTER — OFFICE VISIT (OUTPATIENT)
Dept: BEHAVIORAL/MENTAL HEALTH CLINIC | Age: 43
End: 2023-10-03
Payer: MEDICAID

## 2023-10-03 DIAGNOSIS — F41.0 GENERALIZED ANXIETY DISORDER WITH PANIC ATTACKS: Primary | ICD-10-CM

## 2023-10-03 DIAGNOSIS — F10.20 ALCOHOL USE DISORDER, MODERATE, DEPENDENCE (HCC): ICD-10-CM

## 2023-10-03 DIAGNOSIS — F41.1 GENERALIZED ANXIETY DISORDER WITH PANIC ATTACKS: Primary | ICD-10-CM

## 2023-10-03 PROCEDURE — 90834 PSYTX W PT 45 MINUTES: CPT | Performed by: SOCIAL WORKER

## 2023-10-03 ASSESSMENT — ANXIETY QUESTIONNAIRES
5. BEING SO RESTLESS THAT IT IS HARD TO SIT STILL: 0
4. TROUBLE RELAXING: 1
3. WORRYING TOO MUCH ABOUT DIFFERENT THINGS: 2
1. FEELING NERVOUS, ANXIOUS, OR ON EDGE: 1
7. FEELING AFRAID AS IF SOMETHING AWFUL MIGHT HAPPEN: 2
GAD7 TOTAL SCORE: 8
IF YOU CHECKED OFF ANY PROBLEMS ON THIS QUESTIONNAIRE, HOW DIFFICULT HAVE THESE PROBLEMS MADE IT FOR YOU TO DO YOUR WORK, TAKE CARE OF THINGS AT HOME, OR GET ALONG WITH OTHER PEOPLE: SOMEWHAT DIFFICULT
6. BECOMING EASILY ANNOYED OR IRRITABLE: 1
2. NOT BEING ABLE TO STOP OR CONTROL WORRYING: 1

## 2023-10-23 ENCOUNTER — TELEPHONE (OUTPATIENT)
Dept: BEHAVIORAL/MENTAL HEALTH CLINIC | Age: 43
End: 2023-10-23

## 2023-10-23 NOTE — TELEPHONE ENCOUNTER
Pt cancelled appt with me for tomorrow due to dental appt; he has rescheduled for 10/31/23.   Aissatou Pereira, SENIA

## 2023-10-23 NOTE — PROGRESS NOTES
Length of meeting; 53 minutes    Start Time: 523    Stop Time: 4065    Diagnosis:  Generalized anxiety disorder with panic attacks. Alcohol use disorder, moderate, dependence. Treatment Plan: Encourage AA attendance for alcohol use. Exposure and response activation tx for anxiety. AWARE process for panic attack. Patient Prognosis: Fair    Patient Progress: The patient indicates that he is \"was doing really well, then I hit a wall\". He notes that he had some health anxiety related to his heart and notes that \"every little twinge\" causes him significant anxiety. We focused on this today, including the hx of same, beginning at age 15 when his grandfather  from a ht attack. Using CBT principles (evidence based on his past family hx of ht disease, then comparing his own health results, then adding in some of the skills that he has used with his anxiety (helpful thought?; what would a more helpful thought be), and we combined these to give him some thoughts that he contrived in session today to use in the future should this recur. Reviewed prior assignment, which was to: Work on [employer]" his anxiety, which we discussed and to also continue to come up with more helpful thoughts when automatic thoughts that are negative that relate to anxiety, occur. Mental Status exam: GLO-7 is done with a score of   ; his prior score was a 8. No current active SI or HI and continues to contract for safety. Thought processes appear normal.  Speech is goal directed. The patient does not appear to be responding to internal stimuli. Plan: I will see this patient again in about 3  weeks.     SENIA Busby

## 2023-10-31 ENCOUNTER — OFFICE VISIT (OUTPATIENT)
Dept: BEHAVIORAL/MENTAL HEALTH CLINIC | Age: 43
End: 2023-10-31
Payer: MEDICAID

## 2023-10-31 DIAGNOSIS — F10.20 ALCOHOL USE DISORDER, MODERATE, DEPENDENCE (HCC): ICD-10-CM

## 2023-10-31 DIAGNOSIS — F41.0 GENERALIZED ANXIETY DISORDER WITH PANIC ATTACKS: Primary | ICD-10-CM

## 2023-10-31 DIAGNOSIS — F41.1 GENERALIZED ANXIETY DISORDER WITH PANIC ATTACKS: Primary | ICD-10-CM

## 2023-10-31 PROCEDURE — 90837 PSYTX W PT 60 MINUTES: CPT | Performed by: SOCIAL WORKER

## 2023-11-17 NOTE — PROGRESS NOTES
Length of meeting; 41 minutes    Start Time: 5517    Stop Time: 0861    Diagnosis:  Generalized anxiety disorder with panic attacks. Alcohol use disorder, moderate, dependence. Treatment Plan: Encourage AA attendance for alcohol use. Exposure and response activation tx for anxiety. AWARE process for panic attack. Patient Prognosis: Fair    Patient Progress: The patient indicates that he is \"better than last time\". Asked pt about his health concerns as he noted in the prior session that these had spiked. He indicates that       . Followed up on prior assignment, which was to: Work on [employer]" his anxiety, using both verbal means when appropriate as well as internal dialogue. He finds a bit of difficulty in doing this which we discussed. 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      I have asked him to begin to note whether he has more persistent fears related to having a panic attack at a performance or if it is fears related to his health. We will review this next time for possible imaginal exposure technique. Mental Status exam: GLO-7 is done with a score of 9; his prior score was a 8. No current active SI or HI and continues to contract for safety. Thought processes appear normal.  Speech is goal directed. The patient does not appear to be responding to internal stimuli. Plan: I will see this patient again in about 6 weeks.     SENIA Moore

## 2023-11-20 DIAGNOSIS — F41.1 GENERALIZED ANXIETY DISORDER: ICD-10-CM

## 2023-11-20 DIAGNOSIS — E78.2 MIXED HYPERLIPIDEMIA: ICD-10-CM

## 2023-11-20 DIAGNOSIS — I10 ESSENTIAL (PRIMARY) HYPERTENSION: ICD-10-CM

## 2023-11-20 NOTE — TELEPHONE ENCOUNTER
Pt needs a  refill on Atenolol 100Mg  Tricor 145 MG  And [facility]iazepam [facility]Mg [facility]

## 2023-11-21 ENCOUNTER — OFFICE VISIT (OUTPATIENT)
Dept: BEHAVIORAL/MENTAL HEALTH CLINIC | Age: 43
End: 2023-11-21
Payer: MEDICAID

## 2023-11-21 DIAGNOSIS — F41.1 GENERALIZED ANXIETY DISORDER WITH PANIC ATTACKS: Primary | ICD-10-CM

## 2023-11-21 DIAGNOSIS — F10.20 ALCOHOL USE DISORDER, MODERATE, DEPENDENCE (HCC): ICD-10-CM

## 2023-11-21 DIAGNOSIS — F41.0 GENERALIZED ANXIETY DISORDER WITH PANIC ATTACKS: Primary | ICD-10-CM

## 2023-11-21 PROCEDURE — 90834 PSYTX W PT 45 MINUTES: CPT | Performed by: SOCIAL WORKER

## 2023-11-21 RX ORDER — ATENOLOL 100 MG/1
TABLET ORAL
Qty: 30 TABLET | Refills: 5 | Status: SHIPPED | OUTPATIENT
Start: 2023-11-21

## 2023-11-21 RX ORDER — DIAZEPAM 5 MG/1
5 TABLET ORAL 3 TIMES DAILY PRN
Qty: 90 TABLET | Refills: 2 | Status: SHIPPED | OUTPATIENT
Start: 2023-11-21 | End: 2024-02-19

## 2023-11-21 RX ORDER — FENOFIBRATE 145 MG/1
145 TABLET, COATED ORAL DAILY
Qty: 30 TABLET | Refills: 5 | Status: SHIPPED | OUTPATIENT
Start: 2023-11-21

## 2023-11-21 ASSESSMENT — ANXIETY QUESTIONNAIRES
1. FEELING NERVOUS, ANXIOUS, OR ON EDGE: 2
6. BECOMING EASILY ANNOYED OR IRRITABLE: 1
7. FEELING AFRAID AS IF SOMETHING AWFUL MIGHT HAPPEN: 2
5. BEING SO RESTLESS THAT IT IS HARD TO SIT STILL: 0
3. WORRYING TOO MUCH ABOUT DIFFERENT THINGS: 2
4. TROUBLE RELAXING: 1
IF YOU CHECKED OFF ANY PROBLEMS ON THIS QUESTIONNAIRE, HOW DIFFICULT HAVE THESE PROBLEMS MADE IT FOR YOU TO DO YOUR WORK, TAKE CARE OF THINGS AT HOME, OR GET ALONG WITH OTHER PEOPLE: SOMEWHAT DIFFICULT
2. NOT BEING ABLE TO STOP OR CONTROL WORRYING: 1
GAD7 TOTAL SCORE: 9

## 2023-11-21 NOTE — TELEPHONE ENCOUNTER
Patient is requesting refill on diazepam, please. Per PDMP last fill date: 10/22/2023     Last appointment: 05/31/2023    Next appointment: none    Refill pended for today. Patient is also requesting atenolol and fenofibrate refill, please.

## 2023-12-04 ENCOUNTER — OFFICE VISIT (OUTPATIENT)
Dept: BEHAVIORAL/MENTAL HEALTH CLINIC | Age: 43
End: 2023-12-04
Payer: MEDICAID

## 2023-12-04 VITALS
OXYGEN SATURATION: 97 % | HEIGHT: 70 IN | DIASTOLIC BLOOD PRESSURE: 84 MMHG | BODY MASS INDEX: 30.06 KG/M2 | HEART RATE: 75 BPM | WEIGHT: 210 LBS | SYSTOLIC BLOOD PRESSURE: 134 MMHG

## 2023-12-04 DIAGNOSIS — F41.0 PANIC DISORDER: Primary | ICD-10-CM

## 2023-12-04 PROCEDURE — 3079F DIAST BP 80-89 MM HG: CPT | Performed by: STUDENT IN AN ORGANIZED HEALTH CARE EDUCATION/TRAINING PROGRAM

## 2023-12-04 PROCEDURE — 99213 OFFICE O/P EST LOW 20 MIN: CPT | Performed by: STUDENT IN AN ORGANIZED HEALTH CARE EDUCATION/TRAINING PROGRAM

## 2023-12-04 PROCEDURE — 3075F SYST BP GE 130 - 139MM HG: CPT | Performed by: STUDENT IN AN ORGANIZED HEALTH CARE EDUCATION/TRAINING PROGRAM

## 2023-12-04 RX ORDER — FLUOXETINE 10 MG/1
10 CAPSULE ORAL DAILY
Qty: 90 CAPSULE | Refills: 1 | Status: SHIPPED | OUTPATIENT
Start: 2023-12-04

## 2023-12-04 NOTE — PROGRESS NOTES
[address] [address]      Patient Name: Gail Vargas    Patient : 1980    Patient MRN: [id number]    Insurance: [employer] [place] [employer]    Primary Language: English      Date of Service: 2023    Type of Service: Medication management    Other Services Involved: Therapy with Maria Teresa Thomason       Phone Number: [phone]   Emergency Contact: Sergey Lee, eyal, [phone]       Chief Complaint: \"Fairly well, I guess\"      History of Present Illness    Gail Vargas is a 37 y.o. male with reported prior psychiatric history significant for anxiety who presents for medication management. Pt reports that they are currently prescribed: Prozac 10 mg every other day, Valium 5 mg BID PRN (typically takes twice daily). Pt reports that he has adjusted his Prozac schedule to 2 days on, 1 day off, which has been helpful reducing the dissociative symptoms he previously described. Remains engaged with Marcelle Rodriguez for therapy. Reports compliance and denies significant SE. Denies SI/HI, A/VH, paranoia or delusions. Reports that he will experience anxiety symptoms similar to prior PA, but less impairing and are not bothersome. Last Visit (23): Pt reports that he has been doing alright overall. He is now engaged with Marcelle Rodriguez with therapy and has felt that it has been beneficial. Regarding Prozac, he states that if he takes it consistently for 4-5 days, then he will experience \"some kind of dissociative feeling\" and may worsen his anxiety somewhat. This has lead to him taking a few days off before restarting again, which improves these symptoms. However, he believes that it has overall been positive and has reduced his panic attacks significantly. Denies other significant SE. Denies SI/HI, A/VH, paranoia or delusions. Regarding EtOH, pt expresses that it is a \"bit of a problem,\" but is trying to figure out where he stands.  Has spoke with Marcelle Rodriguez regarding use, noting

## 2024-01-02 NOTE — PROGRESS NOTES
Length of meeting; 36   minutes    Start Time: 1112    Stop Time: 1148    Diagnosis:  Generalized anxiety disorder with panic attacks.  Alcohol use disorder, mild, abuse.      Treatment Plan: Encourage AA attendance for alcohol use.  Exposure and response activation tx for anxiety.  AWARE process for panic attack.    Patient Prognosis: Fair    Patient Progress: The patient indicates that he is \"doing pretty well\".       I reviewed with him the prior assignment where I had asked him to begin to note whether he has more persistent fears related to having a panic attack at a performance or if it is fears related to his health. He believes that he is more worried about his health.  He notes that he is fearful of dying of a health issue such as his heart.  I have asked him to write the story of his death from a heart issue; I have given him printed guidelines to follow in writing the story.  We will do an imaginal exposure next meeting.     He notes that when he compares this holiday season with the prior one, he notes that he was far more present with his family this year than in prior ones.  He notes that in prior years \"I would be loading up my jacket with mini bottles\" (of alcohol), and this year he had very little to none to drink.  Overall alcohol use continues to decline.    Mental Status exam: GLO-7 is done with a score of 6; his prior score was a 9. Initial score was a 16. No current active SI or HI and continues to contract for safety.   Thought processes appear normal.  Speech is goal directed.  The patient does not appear to be responding to internal stimuli.      Plan: I will see this patient again in about 3-4 weeks.    SENIA Helm

## 2024-01-03 ENCOUNTER — OFFICE VISIT (OUTPATIENT)
Dept: BEHAVIORAL/MENTAL HEALTH CLINIC | Age: 44
End: 2024-01-03
Payer: MEDICAID

## 2024-01-03 DIAGNOSIS — F41.1 GENERALIZED ANXIETY DISORDER WITH PANIC ATTACKS: Primary | ICD-10-CM

## 2024-01-03 DIAGNOSIS — F41.0 GENERALIZED ANXIETY DISORDER WITH PANIC ATTACKS: Primary | ICD-10-CM

## 2024-01-03 DIAGNOSIS — F10.10 ALCOHOL USE DISORDER, MILD, ABUSE: ICD-10-CM

## 2024-01-03 PROCEDURE — 90832 PSYTX W PT 30 MINUTES: CPT | Performed by: SOCIAL WORKER

## 2024-01-03 ASSESSMENT — ANXIETY QUESTIONNAIRES
1. FEELING NERVOUS, ANXIOUS, OR ON EDGE: 1
6. BECOMING EASILY ANNOYED OR IRRITABLE: 1
2. NOT BEING ABLE TO STOP OR CONTROL WORRYING: 1
IF YOU CHECKED OFF ANY PROBLEMS ON THIS QUESTIONNAIRE, HOW DIFFICULT HAVE THESE PROBLEMS MADE IT FOR YOU TO DO YOUR WORK, TAKE CARE OF THINGS AT HOME, OR GET ALONG WITH OTHER PEOPLE: SOMEWHAT DIFFICULT
5. BEING SO RESTLESS THAT IT IS HARD TO SIT STILL: 0
4. TROUBLE RELAXING: 0
GAD7 TOTAL SCORE: 6
7. FEELING AFRAID AS IF SOMETHING AWFUL MIGHT HAPPEN: 1
3. WORRYING TOO MUCH ABOUT DIFFERENT THINGS: 2

## 2024-01-24 NOTE — PROGRESS NOTES
Length of meeting; 40  minutes    Start Time: 1103    Stop Time: 1143    Diagnosis:  Generalized anxiety disorder with panic attacks.  Alcohol use disorder, mild, abuse.      Treatment Plan: Encourage AA attendance for alcohol use.  Exposure and response activation tx for anxiety.  AWARE process for panic attack.    Patient Prognosis: Fair    Patient Progress: The patient indicates that he is \"doing OK \".       We did an imaginal exposure today (prior assignment with printed guidelines for same given in prior session) related to his fear of dying of a health issue such as his heart.  I had asked him to write the story of his death from a heart issue  He notes that while he was writing this he was at an 8; using SUDS.  His initial score using SUDS, in the office was a 3.  He went as high as a 6.  As I had him change the rafia involved, it was noted to become lower. Near end of session he notes that he was at a one or 2.     We talked about use of the story in the future at home to enable him to increase his resilience with this fear.  He does note that he thinks his panic attacks in concert were likely due to \"I might die.'  He found the process today helpful.      Mental Status exam: GLO-7 is done with a score of 6; his prior score was a 6. Initial score was a 16.  No current active SI or HI and continues to contract for safety.   Thought processes appear normal.  Speech is goal directed.  The patient does not appear to be responding to internal stimuli.      Plan: I will see this patient again in about 2 months; we will begin to space sessions out.    SENIA Helm

## 2024-01-30 ENCOUNTER — OFFICE VISIT (OUTPATIENT)
Dept: BEHAVIORAL/MENTAL HEALTH CLINIC | Age: 44
End: 2024-01-30
Payer: MEDICAID

## 2024-01-30 DIAGNOSIS — F41.0 GENERALIZED ANXIETY DISORDER WITH PANIC ATTACKS: Primary | ICD-10-CM

## 2024-01-30 DIAGNOSIS — F10.10 ALCOHOL USE DISORDER, MILD, ABUSE: ICD-10-CM

## 2024-01-30 DIAGNOSIS — F41.1 GENERALIZED ANXIETY DISORDER WITH PANIC ATTACKS: Primary | ICD-10-CM

## 2024-01-30 PROCEDURE — 90834 PSYTX W PT 45 MINUTES: CPT | Performed by: SOCIAL WORKER

## 2024-01-30 ASSESSMENT — ANXIETY QUESTIONNAIRES
3. WORRYING TOO MUCH ABOUT DIFFERENT THINGS: 1
6. BECOMING EASILY ANNOYED OR IRRITABLE: 1
7. FEELING AFRAID AS IF SOMETHING AWFUL MIGHT HAPPEN: 2
2. NOT BEING ABLE TO STOP OR CONTROL WORRYING: 0
IF YOU CHECKED OFF ANY PROBLEMS ON THIS QUESTIONNAIRE, HOW DIFFICULT HAVE THESE PROBLEMS MADE IT FOR YOU TO DO YOUR WORK, TAKE CARE OF THINGS AT HOME, OR GET ALONG WITH OTHER PEOPLE: SOMEWHAT DIFFICULT
1. FEELING NERVOUS, ANXIOUS, OR ON EDGE: 1
4. TROUBLE RELAXING: 1
5. BEING SO RESTLESS THAT IT IS HARD TO SIT STILL: 0
GAD7 TOTAL SCORE: 6

## 2024-02-19 DIAGNOSIS — F41.1 ANXIETY, GENERALIZED: ICD-10-CM

## 2024-02-20 RX ORDER — DIAZEPAM 5 MG/1
5 TABLET ORAL 2 TIMES DAILY
Qty: 60 TABLET | Refills: 2 | Status: SHIPPED | OUTPATIENT
Start: 2024-02-20 | End: 2024-05-20

## 2024-03-11 ENCOUNTER — TELEPHONE (OUTPATIENT)
Dept: INTERNAL MEDICINE CLINIC | Facility: CLINIC | Age: 44
End: 2024-03-11

## 2024-03-11 DIAGNOSIS — F41.1 ANXIETY, GENERALIZED: ICD-10-CM

## 2024-03-11 NOTE — TELEPHONE ENCOUNTER
Pt said when he called in his dizepam 5m it looks like the dosing has changed    Take 1 3x a day 60 pill  Take 1 up to 3 x a day pills    He is 30 pills shorter, is he tapering off he is not sure  No one talked to him about it.    His pharmacy is [facility]

## 2024-03-12 RX ORDER — DIAZEPAM 5 MG/1
5 TABLET ORAL EVERY 8 HOURS PRN
Qty: 90 TABLET | Refills: 2 | Status: SHIPPED | OUTPATIENT
Start: 2024-03-12 | End: 2024-06-10

## 2024-03-12 NOTE — TELEPHONE ENCOUNTER
I just filled what was in his chart, but I do see that he was taking tid in the past; new rx sent to pharm;

## 2024-03-15 NOTE — TELEPHONE ENCOUNTER
Talked to pt and pt does NOT need a PA, pharmacy just needed a authorization from Dr. Powell to do an early refill for the new directions.  Per Dr. Powell, ok to fill early.

## 2024-03-31 NOTE — PROGRESS NOTES
Length of meeting;  25  minutes    Start Time: 0900    Stop Time: 0925    Diagnosis:  Generalized anxiety disorder with panic attacks.  Alcohol use disorder, mild, abuse.      Treatment Plan: Encourage AA attendance for alcohol use.  Exposure and response activation tx for anxiety.  AWARE process for panic attack.    Patient Prognosis: Fair    Patient Progress: The patient indicates that he is \"doing OK\".  Queried his response to imaginal exposure done in our prior session, related to his fear of dying from a heart attack.  Notably, he has written a anuja, using the words that related to what he had written out.  He notes that his fear has diminished related to his fear of dying from a heart attack.  He notes that when he worries now about various aches and pains in his body that he is using the CBT process that he finds helpful.  He notes \"my heart thing was the hump to get over.\"  He believes that he is in a much better place with the patient.  We talked about future sessions and some options for him.  After discussion, he would like to see me again in 6 months, and then if he is doing well, we will plan to terminate therapy.    Mental Status exam: GLO-7 is done with a score of 7; his prior score was a 6. Initial score was a 16.  No current active SI or HI and continues to contract for safety.   Thought processes appear normal.  Speech is goal directed.  The patient does not appear to be responding to internal stimuli.      Plan: I will see this patient again in about 6 months. SENIA Helm

## 2024-04-01 ENCOUNTER — OFFICE VISIT (OUTPATIENT)
Dept: BEHAVIORAL/MENTAL HEALTH CLINIC | Age: 44
End: 2024-04-01
Payer: MEDICAID

## 2024-04-01 DIAGNOSIS — F10.10 ALCOHOL USE DISORDER, MILD, ABUSE: ICD-10-CM

## 2024-04-01 DIAGNOSIS — F41.1 GENERALIZED ANXIETY DISORDER WITH PANIC ATTACKS: Primary | ICD-10-CM

## 2024-04-01 DIAGNOSIS — F41.0 GENERALIZED ANXIETY DISORDER WITH PANIC ATTACKS: Primary | ICD-10-CM

## 2024-04-01 PROCEDURE — 90832 PSYTX W PT 30 MINUTES: CPT | Performed by: SOCIAL WORKER

## 2024-04-01 ASSESSMENT — ANXIETY QUESTIONNAIRES
GAD7 TOTAL SCORE: 7
6. BECOMING EASILY ANNOYED OR IRRITABLE: SEVERAL DAYS
7. FEELING AFRAID AS IF SOMETHING AWFUL MIGHT HAPPEN: SEVERAL DAYS
1. FEELING NERVOUS, ANXIOUS, OR ON EDGE: SEVERAL DAYS
IF YOU CHECKED OFF ANY PROBLEMS ON THIS QUESTIONNAIRE, HOW DIFFICULT HAVE THESE PROBLEMS MADE IT FOR YOU TO DO YOUR WORK, TAKE CARE OF THINGS AT HOME, OR GET ALONG WITH OTHER PEOPLE: SOMEWHAT DIFFICULT
5. BEING SO RESTLESS THAT IT IS HARD TO SIT STILL: NOT AT ALL
4. TROUBLE RELAXING: SEVERAL DAYS
2. NOT BEING ABLE TO STOP OR CONTROL WORRYING: SEVERAL DAYS
3. WORRYING TOO MUCH ABOUT DIFFERENT THINGS: MORE THAN HALF THE DAYS

## 2024-05-17 DIAGNOSIS — I10 ESSENTIAL (PRIMARY) HYPERTENSION: ICD-10-CM

## 2024-05-17 DIAGNOSIS — E78.2 MIXED HYPERLIPIDEMIA: ICD-10-CM

## 2024-05-17 DIAGNOSIS — F41.0 PANIC DISORDER: ICD-10-CM

## 2024-05-17 RX ORDER — FLUOXETINE 10 MG/1
10 CAPSULE ORAL EVERY OTHER DAY
Qty: 15 CAPSULE | Refills: 0 | Status: SHIPPED | OUTPATIENT
Start: 2024-05-17

## 2024-05-17 RX ORDER — FENOFIBRATE 145 MG/1
145 TABLET, COATED ORAL DAILY
Qty: 30 TABLET | Refills: 5 | OUTPATIENT
Start: 2024-05-17

## 2024-05-17 RX ORDER — ATENOLOL 100 MG/1
TABLET ORAL
Qty: 30 TABLET | Refills: 5 | OUTPATIENT
Start: 2024-05-17

## 2024-05-20 DIAGNOSIS — E78.2 MIXED HYPERLIPIDEMIA: ICD-10-CM

## 2024-05-20 DIAGNOSIS — I10 ESSENTIAL (PRIMARY) HYPERTENSION: ICD-10-CM

## 2024-05-21 RX ORDER — ATENOLOL 100 MG/1
TABLET ORAL
Qty: 30 TABLET | Refills: 5 | Status: SHIPPED | OUTPATIENT
Start: 2024-05-21

## 2024-05-21 RX ORDER — FENOFIBRATE 145 MG/1
145 TABLET, COATED ORAL DAILY
Qty: 30 TABLET | Refills: 5 | Status: SHIPPED | OUTPATIENT
Start: 2024-05-21

## 2024-06-03 ENCOUNTER — OFFICE VISIT (OUTPATIENT)
Dept: BEHAVIORAL/MENTAL HEALTH CLINIC | Age: 44
End: 2024-06-03
Payer: MEDICAID

## 2024-06-03 DIAGNOSIS — F41.0 PANIC DISORDER: Primary | ICD-10-CM

## 2024-06-03 PROCEDURE — 99213 OFFICE O/P EST LOW 20 MIN: CPT | Performed by: STUDENT IN AN ORGANIZED HEALTH CARE EDUCATION/TRAINING PROGRAM

## 2024-06-03 RX ORDER — FLUOXETINE 10 MG/1
10 CAPSULE ORAL EVERY OTHER DAY
Qty: 90 CAPSULE | Refills: 1 | Status: SHIPPED | OUTPATIENT
Start: 2024-06-03

## 2024-06-03 NOTE — PROGRESS NOTES
significant concerns at this time. Reports significant improvement in baseline anxiety and denies panic attacks over the past few months. He has remained engaged with Nick for therapy, but will likely be terminating soon. Reports compliance and denies significant SE. Denies SI/HI, A/VH, paranoia or delusions. Due to overall stability, pt is comfortable with returning to PCP and will f/u PRN.     Diagnosis:   Panic disorder         Plan:    Vito Simpson will receive medication management, therapy at [facility].    Medication Recommendations:    - Continue Prozac 10 mg every other day for anxiety    - Continue Valium 5 mg BID PRN, but discussed minimizing use, especially around EtOH in effort to reduce overall risk    - Medication side effect profiles, risks, and benefits were discussed with the patient.    - Patient encouraged to contact the clinic if experiencing any adverse reactions with medications.      Other Recommendations:    - Established with Nick for therapy    - If patient has any concerns for their own safety due to adverse reactions to medications, suicidal or homicidal ideations, auditory or visual hallucinations, or delusions, they have been told to call 911 or go to the nearest emergency department.      RTC: PRN; pt to return to PCP        Talat Mujica MD    6/3/2024 11:21 AM    [facility]

## 2024-06-10 ENCOUNTER — TELEPHONE (OUTPATIENT)
Dept: INTERNAL MEDICINE CLINIC | Facility: CLINIC | Age: 44
End: 2024-06-10

## 2024-06-10 DIAGNOSIS — F41.1 ANXIETY, GENERALIZED: ICD-10-CM

## 2024-06-10 NOTE — TELEPHONE ENCOUNTER
Pt needs a refill on his  diazpam     It seems the dosage has changed and he is now getting 60 pills instead of 90 pills    If dosing is different he was not told and would like a call from Dr. Powell or Melba to discuss this medication, this isn't the first time this has happened

## 2024-06-11 RX ORDER — DIAZEPAM 5 MG/1
5 TABLET ORAL EVERY 8 HOURS PRN
Qty: 90 TABLET | Refills: 2 | Status: SHIPPED | OUTPATIENT
Start: 2024-06-11 | End: 2024-09-09

## 2024-09-30 DIAGNOSIS — F41.1 ANXIETY, GENERALIZED: ICD-10-CM

## 2024-09-30 RX ORDER — DIAZEPAM 5 MG/1
5 TABLET ORAL EVERY 8 HOURS PRN
Qty: 90 TABLET | Refills: 2 | OUTPATIENT
Start: 2024-09-30 | End: 2024-12-29

## 2024-10-01 DIAGNOSIS — F41.1 ANXIETY, GENERALIZED: ICD-10-CM

## 2024-10-01 RX ORDER — DIAZEPAM 5 MG
5 TABLET ORAL EVERY 8 HOURS PRN
Qty: 90 TABLET | Refills: 0 | Status: SHIPPED | OUTPATIENT
Start: 2024-10-01 | End: 2024-12-30

## 2024-10-01 NOTE — TELEPHONE ENCOUNTER
Talked to pt and informed him, per Dr. Powell, Rx sent, due for visit for further refills, as he has not been seen in over a year.  Pt stated he didn't have his calendar w/ him and would call back to schedule an appt.

## 2024-10-01 NOTE — TELEPHONE ENCOUNTER
Medication Refill Request    Name of Medication : diazepam    Strength of Medication: 5 mg    Directions: unknown    30 day or 90 day supply: 90    [facility] [address]    Last Appt. Date: 5/1/23    Next Appt. Date: no future appointment    Additional Information For Provider:

## 2024-10-07 NOTE — PROGRESS NOTES
Length of meeting;  30  minutes    Start Time: 1100    Stop Time:  1130    Diagnosis:  Generalized anxiety disorder with panic attacks.  Alcohol use disorder, mild, abuse.      Treatment Plan: Encourage AA attendance for alcohol use.  Exposure and response activation tx for anxiety.  AWARE process for panic attack.    Patient Prognosis: Fair    Patient Progress: The patient indicates that he is \"doing OK\". He notes that he will have some anxiety at times with his shows that he does but notes that he knows what to do when he does become anxious.  He notes \"I used to dread every show and that is no longer the case.\"  He notes that his use of the AWARE process is \"instinctive\" when he has a panic attack.        Mental Status exam: GLO-7 is a 5.  Notes his anxiety has remained improved overall.  No current active SI or HI and continues to contract for safety.   Thought processes appear normal.  Speech is goal directed.  The patient does not appear to be responding to internal stimuli.      Plan: Pt would like to stop therapy for now.  He is aware that if he needs me he may call to reschedule in the future.  SENIA Helm

## 2024-10-16 ENCOUNTER — OFFICE VISIT (OUTPATIENT)
Dept: BEHAVIORAL/MENTAL HEALTH CLINIC | Age: 44
End: 2024-10-16
Payer: MEDICAID

## 2024-10-16 DIAGNOSIS — F41.1 GENERALIZED ANXIETY DISORDER WITH PANIC ATTACKS: Primary | ICD-10-CM

## 2024-10-16 DIAGNOSIS — F10.10 ALCOHOL USE DISORDER, MILD, ABUSE: ICD-10-CM

## 2024-10-16 DIAGNOSIS — F41.0 GENERALIZED ANXIETY DISORDER WITH PANIC ATTACKS: Primary | ICD-10-CM

## 2024-10-16 PROCEDURE — 90832 PSYTX W PT 30 MINUTES: CPT | Performed by: SOCIAL WORKER

## 2024-10-16 ASSESSMENT — ANXIETY QUESTIONNAIRES
7. FEELING AFRAID AS IF SOMETHING AWFUL MIGHT HAPPEN: SEVERAL DAYS
3. WORRYING TOO MUCH ABOUT DIFFERENT THINGS: SEVERAL DAYS
4. TROUBLE RELAXING: NOT AT ALL
IF YOU CHECKED OFF ANY PROBLEMS ON THIS QUESTIONNAIRE, HOW DIFFICULT HAVE THESE PROBLEMS MADE IT FOR YOU TO DO YOUR WORK, TAKE CARE OF THINGS AT HOME, OR GET ALONG WITH OTHER PEOPLE: SOMEWHAT DIFFICULT
6. BECOMING EASILY ANNOYED OR IRRITABLE: SEVERAL DAYS
2. NOT BEING ABLE TO STOP OR CONTROL WORRYING: SEVERAL DAYS
5. BEING SO RESTLESS THAT IT IS HARD TO SIT STILL: NOT AT ALL
GAD7 TOTAL SCORE: 5
1. FEELING NERVOUS, ANXIOUS, OR ON EDGE: SEVERAL DAYS

## 2024-10-21 SDOH — ECONOMIC STABILITY: FOOD INSECURITY: WITHIN THE PAST 12 MONTHS, THE FOOD YOU BOUGHT JUST DIDN'T LAST AND YOU DIDN'T HAVE MONEY TO GET MORE.: NEVER TRUE

## 2024-10-21 SDOH — ECONOMIC STABILITY: INCOME INSECURITY: HOW HARD IS IT FOR YOU TO PAY FOR THE VERY BASICS LIKE FOOD, HOUSING, MEDICAL CARE, AND HEATING?: NOT VERY HARD

## 2024-10-21 SDOH — ECONOMIC STABILITY: FOOD INSECURITY: WITHIN THE PAST 12 MONTHS, YOU WORRIED THAT YOUR FOOD WOULD RUN OUT BEFORE YOU GOT MONEY TO BUY MORE.: NEVER TRUE

## 2024-10-21 SDOH — ECONOMIC STABILITY: TRANSPORTATION INSECURITY
IN THE PAST 12 MONTHS, HAS LACK OF TRANSPORTATION KEPT YOU FROM MEETINGS, WORK, OR FROM GETTING THINGS NEEDED FOR DAILY LIVING?: NO

## 2024-10-24 ENCOUNTER — TELEMEDICINE (OUTPATIENT)
Dept: INTERNAL MEDICINE CLINIC | Facility: CLINIC | Age: 44
End: 2024-10-24
Payer: MEDICAID

## 2024-10-24 DIAGNOSIS — E03.9 ACQUIRED HYPOTHYROIDISM: ICD-10-CM

## 2024-10-24 DIAGNOSIS — F41.1 GENERALIZED ANXIETY DISORDER WITH PANIC ATTACKS: ICD-10-CM

## 2024-10-24 DIAGNOSIS — F41.0 GENERALIZED ANXIETY DISORDER WITH PANIC ATTACKS: ICD-10-CM

## 2024-10-24 DIAGNOSIS — Z11.4 ENCOUNTER FOR SCREENING FOR HIV: ICD-10-CM

## 2024-10-24 DIAGNOSIS — R73.03 PREDIABETES: ICD-10-CM

## 2024-10-24 DIAGNOSIS — F10.20 ALCOHOL USE DISORDER, MODERATE, DEPENDENCE (HCC): ICD-10-CM

## 2024-10-24 DIAGNOSIS — I10 ESSENTIAL (PRIMARY) HYPERTENSION: Primary | ICD-10-CM

## 2024-10-24 DIAGNOSIS — Z11.59 NEED FOR HEPATITIS C SCREENING TEST: ICD-10-CM

## 2024-10-24 DIAGNOSIS — F33.1 MODERATE EPISODE OF RECURRENT MAJOR DEPRESSIVE DISORDER (HCC): ICD-10-CM

## 2024-10-24 DIAGNOSIS — E78.2 MIXED HYPERLIPIDEMIA: ICD-10-CM

## 2024-10-24 DIAGNOSIS — F41.1 ANXIETY, GENERALIZED: ICD-10-CM

## 2024-10-24 DIAGNOSIS — E55.9 VITAMIN D INSUFFICIENCY: ICD-10-CM

## 2024-10-24 PROCEDURE — 99214 OFFICE O/P EST MOD 30 MIN: CPT | Performed by: INTERNAL MEDICINE

## 2024-10-24 RX ORDER — ATENOLOL 100 MG/1
TABLET ORAL
Qty: 30 TABLET | Refills: 5 | Status: SHIPPED | OUTPATIENT
Start: 2024-10-24

## 2024-10-24 RX ORDER — FENOFIBRATE 145 MG/1
145 TABLET, COATED ORAL DAILY
Qty: 30 TABLET | Refills: 5 | Status: SHIPPED | OUTPATIENT
Start: 2024-10-24

## 2024-10-24 RX ORDER — DIAZEPAM 5 MG/1
5 TABLET ORAL EVERY 8 HOURS PRN
Qty: 90 TABLET | Refills: 2 | Status: SHIPPED | OUTPATIENT
Start: 2024-10-24 | End: 2025-01-22

## 2024-10-24 ASSESSMENT — ENCOUNTER SYMPTOMS: RESPIRATORY NEGATIVE: 1

## 2024-10-24 NOTE — ASSESSMENT & PLAN NOTE
Recommended low sodium diet; Goal: take meds as prescribed, monitor blood pressure at home and call with readings.         Orders:    atenolol (TENORMIN) 100 MG tablet; 1/2 tablet by mouth BID

## 2024-10-24 NOTE — PROGRESS NOTES
Vito Simpson, was evaluated through a synchronous (real-time) audio-video encounter. The patient (or guardian if applicable) is aware that this is a billable service, which includes applicable co-pays. This Virtual Visit was conducted with patient's (and/or legal guardian's) consent. Patient identification was verified, and a caregiver was present when appropriate.   The patient was located at Home: [address] [place] [id number]  Provider was located at Facility (Appt Dept): [address] [place] [id number]  Confirm you are appropriately licensed, registered, or certified to deliver care in the state where the patient is located as indicated above. If you are not or unsure, please re-schedule the visit: Yes, I confirm.     Vito Simpson (:  1980) is a Established patient, presenting virtually for evaluation of the following:      Below is the assessment and plan developed based on review of pertinent history, physical exam, labs, studies, and medications.     Assessment & Plan  Essential (primary) hypertension   Recommended low sodium diet; Goal: take meds as prescribed, monitor blood pressure at home and call with readings.         Orders:    atenolol (TENORMIN) 100 MG tablet; 1/2 tablet by mouth BID    Acquired hypothyroidism    Unclear control, will check fasting labs as ordered;     Orders:    TSH; Future    T4, Free; Future    Prediabetes    Unclear control, will check A1c as ordered;     Orders:    Hemoglobin A1C; Future    Moderate episode of recurrent major depressive disorder (HCC)   Chronic, at goal (stable), continue current treatment plan         Mixed hyperlipidemia    Unclear control, will check fasting labs as ordered;     Orders:    fenofibrate (TRICOR) 145 MG tablet; Take 1 tablet by mouth daily    CBC with Auto Differential; Future    Comprehensive Metabolic Panel; Future    Lipid Panel; Future    Urinalysis; Future    Alcohol use

## 2024-10-24 NOTE — ASSESSMENT & PLAN NOTE
Chronic, at goal (stable), continue current treatment plan    Orders:    diazePAM (VALIUM) 5 MG tablet; Take 1 tablet by mouth every 8 hours as needed for Anxiety for up to 90 days. Max Daily Amount: 15 mg

## 2024-10-24 NOTE — ASSESSMENT & PLAN NOTE
Unclear control, will check fasting labs as ordered;     Orders:    TSH; Future    T4, Free; Future

## 2024-10-24 NOTE — ASSESSMENT & PLAN NOTE
Unclear control, will check fasting labs as ordered;     Orders:    fenofibrate (TRICOR) 145 MG tablet; Take 1 tablet by mouth daily    CBC with Auto Differential; Future    Comprehensive Metabolic Panel; Future    Lipid Panel; Future    Urinalysis; Future

## 2024-10-28 DIAGNOSIS — E78.2 MIXED HYPERLIPIDEMIA: ICD-10-CM

## 2024-10-28 DIAGNOSIS — R73.03 PREDIABETES: ICD-10-CM

## 2024-10-28 DIAGNOSIS — E55.9 VITAMIN D INSUFFICIENCY: ICD-10-CM

## 2024-10-28 DIAGNOSIS — Z11.59 NEED FOR HEPATITIS C SCREENING TEST: ICD-10-CM

## 2024-10-28 DIAGNOSIS — Z11.4 ENCOUNTER FOR SCREENING FOR HIV: ICD-10-CM

## 2024-10-28 DIAGNOSIS — E03.9 ACQUIRED HYPOTHYROIDISM: ICD-10-CM

## 2024-10-28 LAB
25(OH)D3 SERPL-MCNC: 24.6 NG/ML (ref 30–100)
ALBUMIN SERPL-MCNC: 4.5 G/DL (ref 3.5–5)
ALBUMIN/GLOB SERPL: 1.6 (ref 1–1.9)
ALP SERPL-CCNC: 57 U/L (ref 40–129)
ALT SERPL-CCNC: 31 U/L (ref 8–55)
ANION GAP SERPL CALC-SCNC: 13 MMOL/L (ref 9–18)
APPEARANCE UR: NORMAL
AST SERPL-CCNC: 33 U/L (ref 15–37)
BACTERIA URNS QL MICRO: 0 /HPF
BASOPHILS # BLD: 0 K/UL (ref 0–0.2)
BASOPHILS NFR BLD: 1 % (ref 0–2)
BILIRUB SERPL-MCNC: 0.5 MG/DL (ref 0–1.2)
BILIRUB UR QL: NEGATIVE
BUN SERPL-MCNC: 14 MG/DL (ref 6–23)
CALCIUM SERPL-MCNC: 9.8 MG/DL (ref 8.8–10.2)
CHLORIDE SERPL-SCNC: 101 MMOL/L (ref 98–107)
CHOLEST SERPL-MCNC: 177 MG/DL (ref 0–200)
CO2 SERPL-SCNC: 25 MMOL/L (ref 20–28)
COLOR UR: NORMAL
CREAT SERPL-MCNC: 1.1 MG/DL (ref 0.8–1.3)
DIFFERENTIAL METHOD BLD: NORMAL
EOSINOPHIL # BLD: 0.2 K/UL (ref 0–0.8)
EOSINOPHIL NFR BLD: 2 % (ref 0.5–7.8)
ERYTHROCYTE [DISTWIDTH] IN BLOOD BY AUTOMATED COUNT: 12.7 % (ref 11.9–14.6)
EST. AVERAGE GLUCOSE BLD GHB EST-MCNC: 113 MG/DL
GLOBULIN SER CALC-MCNC: 2.8 G/DL (ref 2.3–3.5)
GLUCOSE SERPL-MCNC: 93 MG/DL (ref 70–99)
GLUCOSE UR STRIP.AUTO-MCNC: NEGATIVE MG/DL
HBA1C MFR BLD: 5.6 % (ref 0–5.6)
HCT VFR BLD AUTO: 48 % (ref 41.1–50.3)
HCV AB SER QL: NONREACTIVE
HDLC SERPL-MCNC: 39 MG/DL (ref 40–60)
HDLC SERPL: 4.5 (ref 0–5)
HGB BLD-MCNC: 16.1 G/DL (ref 13.6–17.2)
HGB UR QL STRIP: NEGATIVE
HIV 1+2 AB+HIV1 P24 AG SERPL QL IA: NONREACTIVE
HIV 1/2 RESULT COMMENT: NORMAL
IMM GRANULOCYTES # BLD AUTO: 0 K/UL (ref 0–0.5)
IMM GRANULOCYTES NFR BLD AUTO: 0 % (ref 0–5)
KETONES UR QL STRIP.AUTO: NEGATIVE MG/DL
LDLC SERPL CALC-MCNC: 95 MG/DL (ref 0–100)
LEUKOCYTE ESTERASE UR QL STRIP.AUTO: NEGATIVE
LYMPHOCYTES # BLD: 2.6 K/UL (ref 0.5–4.6)
LYMPHOCYTES NFR BLD: 30 % (ref 13–44)
MCH RBC QN AUTO: 30 PG (ref 26.1–32.9)
MCHC RBC AUTO-ENTMCNC: 33.5 G/DL (ref 31.4–35)
MCV RBC AUTO: 89.4 FL (ref 82–102)
MONOCYTES # BLD: 0.7 K/UL (ref 0.1–1.3)
MONOCYTES NFR BLD: 8 % (ref 4–12)
NEUTS SEG # BLD: 5.2 K/UL (ref 1.7–8.2)
NEUTS SEG NFR BLD: 59 % (ref 43–78)
NITRITE UR QL STRIP.AUTO: NEGATIVE
NRBC # BLD: 0 K/UL (ref 0–0.2)
PH UR STRIP: 5.5 (ref 5–9)
PLATELET # BLD AUTO: 306 K/UL (ref 150–450)
PMV BLD AUTO: 10.7 FL (ref 9.4–12.3)
POTASSIUM SERPL-SCNC: 3.7 MMOL/L (ref 3.5–5.1)
PROT SERPL-MCNC: 7.3 G/DL (ref 6.3–8.2)
PROT UR STRIP-MCNC: NEGATIVE MG/DL
RBC # BLD AUTO: 5.37 M/UL (ref 4.23–5.6)
SODIUM SERPL-SCNC: 139 MMOL/L (ref 136–145)
SP GR UR REFRACTOMETRY: 1.02 (ref 1–1.02)
T4 FREE SERPL-MCNC: 1.2 NG/DL (ref 0.9–1.7)
TRIGL SERPL-MCNC: 215 MG/DL (ref 0–150)
TSH, 3RD GENERATION: 3.22 UIU/ML (ref 0.27–4.2)
UROBILINOGEN UR QL STRIP.AUTO: 0.2 EU/DL (ref 0.2–1)
VLDLC SERPL CALC-MCNC: 43 MG/DL (ref 6–23)
WBC # BLD AUTO: 8.7 K/UL (ref 4.3–11.1)

## 2024-10-29 RX ORDER — ERGOCALCIFEROL 1.25 MG/1
50000 CAPSULE, LIQUID FILLED ORAL WEEKLY
Qty: 12 CAPSULE | Refills: 1 | Status: SHIPPED | OUTPATIENT
Start: 2024-10-29

## 2025-01-22 DIAGNOSIS — F41.1 ANXIETY, GENERALIZED: ICD-10-CM

## 2025-01-22 RX ORDER — DIAZEPAM 5 MG/1
5 TABLET ORAL EVERY 8 HOURS PRN
Qty: 90 TABLET | Refills: 2 | Status: SHIPPED | OUTPATIENT
Start: 2025-01-22 | End: 2025-04-22

## 2025-01-22 NOTE — TELEPHONE ENCOUNTER
Medication Refill Request    Name of Medication : diazepam    Strength of Medication: 5 mg    Directions: 1 tab daily    30 day or 90 day supply: 90    [facility] [address] [facility] [place]    Last Appt. Date: 10/24/24    Next Appt. Date: 4/29/25    Additional Information For Provider: states it will run out on 1/25

## 2025-04-29 ENCOUNTER — OFFICE VISIT (OUTPATIENT)
Dept: INTERNAL MEDICINE CLINIC | Facility: CLINIC | Age: 45
End: 2025-04-29
Payer: MEDICAID

## 2025-04-29 VITALS
OXYGEN SATURATION: 98 % | HEART RATE: 73 BPM | WEIGHT: 220 LBS | DIASTOLIC BLOOD PRESSURE: 85 MMHG | SYSTOLIC BLOOD PRESSURE: 140 MMHG | HEIGHT: 70 IN | BODY MASS INDEX: 31.5 KG/M2 | TEMPERATURE: 97.2 F

## 2025-04-29 DIAGNOSIS — F41.0 PANIC DISORDER: ICD-10-CM

## 2025-04-29 DIAGNOSIS — F41.1 GENERALIZED ANXIETY DISORDER WITH PANIC ATTACKS: ICD-10-CM

## 2025-04-29 DIAGNOSIS — F10.10 ALCOHOL USE DISORDER, MILD, ABUSE: ICD-10-CM

## 2025-04-29 DIAGNOSIS — E03.9 ACQUIRED HYPOTHYROIDISM: ICD-10-CM

## 2025-04-29 DIAGNOSIS — R73.03 PREDIABETES: ICD-10-CM

## 2025-04-29 DIAGNOSIS — E55.9 VITAMIN D INSUFFICIENCY: ICD-10-CM

## 2025-04-29 DIAGNOSIS — I10 ESSENTIAL (PRIMARY) HYPERTENSION: Primary | ICD-10-CM

## 2025-04-29 DIAGNOSIS — F41.0 GENERALIZED ANXIETY DISORDER WITH PANIC ATTACKS: ICD-10-CM

## 2025-04-29 DIAGNOSIS — E78.2 MIXED HYPERLIPIDEMIA: ICD-10-CM

## 2025-04-29 DIAGNOSIS — F41.1 ANXIETY, GENERALIZED: ICD-10-CM

## 2025-04-29 DIAGNOSIS — F33.1 MODERATE EPISODE OF RECURRENT MAJOR DEPRESSIVE DISORDER (HCC): ICD-10-CM

## 2025-04-29 PROBLEM — F10.90 ALCOHOL USE: Status: RESOLVED | Noted: 2020-01-17 | Resolved: 2025-04-29

## 2025-04-29 PROCEDURE — 3079F DIAST BP 80-89 MM HG: CPT | Performed by: INTERNAL MEDICINE

## 2025-04-29 PROCEDURE — 3077F SYST BP >= 140 MM HG: CPT | Performed by: INTERNAL MEDICINE

## 2025-04-29 PROCEDURE — 99214 OFFICE O/P EST MOD 30 MIN: CPT | Performed by: INTERNAL MEDICINE

## 2025-04-29 RX ORDER — DIAZEPAM 5 MG/1
5 TABLET ORAL EVERY 8 HOURS PRN
Qty: 90 TABLET | Refills: 2 | Status: SHIPPED | OUTPATIENT
Start: 2025-04-29 | End: 2025-07-28

## 2025-04-29 RX ORDER — FLUOXETINE 10 MG/1
10 CAPSULE ORAL EVERY OTHER DAY
Qty: 30 CAPSULE | Refills: 12 | Status: SHIPPED | OUTPATIENT
Start: 2025-04-29

## 2025-04-29 RX ORDER — ATENOLOL 100 MG/1
TABLET ORAL
Qty: 30 TABLET | Refills: 12 | Status: SHIPPED | OUTPATIENT
Start: 2025-04-29

## 2025-04-29 RX ORDER — FENOFIBRATE 145 MG/1
145 TABLET, COATED ORAL DAILY
Qty: 30 TABLET | Refills: 12 | Status: SHIPPED | OUTPATIENT
Start: 2025-04-29

## 2025-04-29 RX ORDER — ERGOCALCIFEROL 1.25 MG/1
50000 CAPSULE, LIQUID FILLED ORAL WEEKLY
Qty: 4 CAPSULE | Refills: 5 | Status: SHIPPED | OUTPATIENT
Start: 2025-04-29

## 2025-04-29 ASSESSMENT — PATIENT HEALTH QUESTIONNAIRE - PHQ9
SUM OF ALL RESPONSES TO PHQ QUESTIONS 1-9: 0
9. THOUGHTS THAT YOU WOULD BE BETTER OFF DEAD, OR OF HURTING YOURSELF: NOT AT ALL
SUM OF ALL RESPONSES TO PHQ QUESTIONS 1-9: 0
4. FEELING TIRED OR HAVING LITTLE ENERGY: NOT AT ALL
1. LITTLE INTEREST OR PLEASURE IN DOING THINGS: NOT AT ALL
7. TROUBLE CONCENTRATING ON THINGS, SUCH AS READING THE NEWSPAPER OR WATCHING TELEVISION: NOT AT ALL
2. FEELING DOWN, DEPRESSED OR HOPELESS: NOT AT ALL
6. FEELING BAD ABOUT YOURSELF - OR THAT YOU ARE A FAILURE OR HAVE LET YOURSELF OR YOUR FAMILY DOWN: NOT AT ALL
10. IF YOU CHECKED OFF ANY PROBLEMS, HOW DIFFICULT HAVE THESE PROBLEMS MADE IT FOR YOU TO DO YOUR WORK, TAKE CARE OF THINGS AT HOME, OR GET ALONG WITH OTHER PEOPLE: NOT DIFFICULT AT ALL
SUM OF ALL RESPONSES TO PHQ QUESTIONS 1-9: 0
3. TROUBLE FALLING OR STAYING ASLEEP: NOT AT ALL
8. MOVING OR SPEAKING SO SLOWLY THAT OTHER PEOPLE COULD HAVE NOTICED. OR THE OPPOSITE, BEING SO FIGETY OR RESTLESS THAT YOU HAVE BEEN MOVING AROUND A LOT MORE THAN USUAL: NOT AT ALL
5. POOR APPETITE OR OVEREATING: NOT AT ALL
SUM OF ALL RESPONSES TO PHQ QUESTIONS 1-9: 0

## 2025-04-29 ASSESSMENT — ENCOUNTER SYMPTOMS
COUGH: 0
SHORTNESS OF BREATH: 0
RHINORRHEA: 0
ABDOMINAL PAIN: 0
BLOOD IN STOOL: 0

## 2025-04-29 NOTE — PROGRESS NOTES
Movements: Extraocular movements intact.      Conjunctiva/sclera: Conjunctivae normal.      Pupils: Pupils are equal, round, and reactive to light.   Cardiovascular:      Rate and Rhythm: Normal rate and regular rhythm.      Pulses: Normal pulses.      Heart sounds: Normal heart sounds.   Pulmonary:      Effort: Pulmonary effort is normal.      Breath sounds: Normal breath sounds.   Abdominal:      General: Abdomen is flat. Bowel sounds are normal.      Palpations: Abdomen is soft.   Musculoskeletal:         General: Normal range of motion.      Cervical back: Normal range of motion and neck supple.   Skin:     General: Skin is warm and dry.   Neurological:      General: No focal deficit present.      Mental Status: He is alert and oriented to person, place, and time. Mental status is at baseline.   Psychiatric:         Mood and Affect: Mood normal.         Behavior: Behavior normal.           Assessment/Plan:    Vito was seen today for 6 month follow-up.    Diagnoses and all orders for this visit:    Essential (primary) hypertension  Stable, continue medication, diet.     -     atenolol (TENORMIN) 100 MG tablet; 1/2 tablet by mouth BID  -     Urinalysis; Future    Vitamin D insufficiency  Instructed to take medications as prescribed, in addition to vitamin D 9244-1354 IU qd otc. Will monitor level.    -     vitamin D (ERGOCALCIFEROL) 1.25 MG (27060 UT) CAPS capsule; Take 1 capsule by mouth once a week  -     Vitamin D 25 Hydroxy; Future    Mixed hyperlipidemia  Recommended low fat, low cholesterol diet, regular exercise.    -     fenofibrate (TRICOR) 145 MG tablet; Take 1 tablet by mouth daily  -     CBC with Auto Differential; Future  -     Comprehensive Metabolic Panel; Future  -     Lipid Panel; Future    Acquired hypothyroidism  -     T4, Free; Future  -     TSH; Future    Alcohol use disorder, mild, abuse  He has cut back on alcohol use and feeling well;     Generalized anxiety disorder with panic

## 2025-07-28 DIAGNOSIS — F41.1 ANXIETY, GENERALIZED: ICD-10-CM

## 2025-07-28 RX ORDER — DIAZEPAM 5 MG/1
5 TABLET ORAL EVERY 8 HOURS PRN
Qty: 90 TABLET | Refills: 2 | Status: SHIPPED | OUTPATIENT
Start: 2025-07-30 | End: 2025-10-28

## 2025-07-28 NOTE — TELEPHONE ENCOUNTER
Controlled Substance Monitoring:    Acute and Chronic Pain Monitoring:        No data to display               [facility] Last [facility] date for Diazepam was a 30day refill on 06/30/2025 for #90days.

## 2025-07-28 NOTE — TELEPHONE ENCOUNTER
Medication Refill Request    Name of Medication : Diazepam    Strength of Medication: 5 Mg    Directions: 1 tablet by mouth every 8 hours as needed     30 day or 90 day supply:     [facility]    Last Appt. Date: 4/29/25    Next Appt. Date: 10/29/25    Additional Information For Provider:

## (undated) DEVICE — NEEDLE SYR 18GA L1.5IN RED PLAS HUB S STL BLNT FILL W/O

## (undated) DEVICE — YANKAUER,BULB TIP,W/O VENT,RIGID,STERILE: Brand: MEDLINE

## (undated) DEVICE — Z DUP USE 2149365 FORCEPS BX L240CM JAW DIA2.8MM L CAP W/ NDL MIC MESH TOOTH

## (undated) DEVICE — CONTAINER FORMALIN PREFILLED 10% NBF 60ML

## (undated) DEVICE — SYRINGE MED 3ML CLR PLAS STD N CTRL LUERLOCK TIP DISP

## (undated) DEVICE — KENDALL RADIOLUCENT FOAM MONITORING ELECTRODE RECTANGULAR SHAPE: Brand: KENDALL

## (undated) DEVICE — AIRLIFE™ OXYGEN TUBING 7 FEET (2.1 M) CRUSH RESISTANT OXYGEN TUBING, VINYL TIPPED: Brand: AIRLIFE™

## (undated) DEVICE — CONNECTOR TBNG OD5-7MM O2 END DISP

## (undated) DEVICE — CANNULA NSL ORAL AD FOR CAPNOFLEX CO2 O2 AIRLFE

## (undated) DEVICE — LUBE JELLY FOIL PACK 1.4 OZ: Brand: MEDLINE INDUSTRIES, INC.

## (undated) DEVICE — GAUZE,SPONGE,4"X4",12PLY,WOVEN,NS,LF: Brand: MEDLINE

## (undated) DEVICE — SYRINGE MED 10ML LUERLOCK TIP W/O SFTY DISP

## (undated) DEVICE — SINGLE PORT MANIFOLD: Brand: NEPTUNE 2

## (undated) DEVICE — SYRINGE, LUER SLIP, STERILE, 60ML: Brand: MEDLINE